# Patient Record
Sex: FEMALE | Race: WHITE | NOT HISPANIC OR LATINO | Employment: OTHER | URBAN - METROPOLITAN AREA
[De-identification: names, ages, dates, MRNs, and addresses within clinical notes are randomized per-mention and may not be internally consistent; named-entity substitution may affect disease eponyms.]

---

## 2019-05-14 ENCOUNTER — TRANSFERRED RECORDS (OUTPATIENT)
Dept: HEALTH INFORMATION MANAGEMENT | Facility: CLINIC | Age: 64
End: 2019-05-14

## 2019-05-21 ENCOUNTER — TRANSFERRED RECORDS (OUTPATIENT)
Dept: HEALTH INFORMATION MANAGEMENT | Facility: CLINIC | Age: 64
End: 2019-05-21

## 2019-06-06 ENCOUNTER — HOSPITAL ENCOUNTER (OUTPATIENT)
Dept: CARDIAC REHAB | Facility: CLINIC | Age: 64
End: 2019-06-06
Attending: INTERNAL MEDICINE
Payer: COMMERCIAL

## 2019-06-06 PROCEDURE — 40000116 ZZH STATISTIC OP CR VISIT: Performed by: REHABILITATION PRACTITIONER

## 2019-06-06 PROCEDURE — 93797 PHYS/QHP OP CAR RHAB WO ECG: CPT | Mod: 59 | Performed by: REHABILITATION PRACTITIONER

## 2019-06-06 PROCEDURE — 93798 PHYS/QHP OP CAR RHAB W/ECG: CPT | Performed by: REHABILITATION PRACTITIONER

## 2019-06-06 PROCEDURE — 40000575 ZZH STATISTIC OP CARDIAC VISIT #2: Performed by: REHABILITATION PRACTITIONER

## 2019-06-11 ENCOUNTER — HOSPITAL ENCOUNTER (OUTPATIENT)
Dept: CARDIAC REHAB | Facility: CLINIC | Age: 64
End: 2019-06-11
Attending: INTERNAL MEDICINE
Payer: COMMERCIAL

## 2019-06-11 PROCEDURE — 40000116 ZZH STATISTIC OP CR VISIT

## 2019-06-11 PROCEDURE — 93798 PHYS/QHP OP CAR RHAB W/ECG: CPT

## 2019-06-13 ENCOUNTER — HOSPITAL ENCOUNTER (OUTPATIENT)
Dept: CARDIAC REHAB | Facility: CLINIC | Age: 64
End: 2019-06-13
Attending: INTERNAL MEDICINE
Payer: COMMERCIAL

## 2019-06-13 PROCEDURE — 93798 PHYS/QHP OP CAR RHAB W/ECG: CPT | Performed by: OCCUPATIONAL THERAPIST

## 2019-06-13 PROCEDURE — 40000116 ZZH STATISTIC OP CR VISIT: Performed by: OCCUPATIONAL THERAPIST

## 2019-06-17 ENCOUNTER — HOSPITAL ENCOUNTER (OUTPATIENT)
Dept: CARDIAC REHAB | Facility: CLINIC | Age: 64
End: 2019-06-17
Attending: INTERNAL MEDICINE
Payer: COMMERCIAL

## 2019-06-17 PROCEDURE — 40000575 ZZH STATISTIC OP CARDIAC VISIT #2: Performed by: OCCUPATIONAL THERAPIST

## 2019-06-17 PROCEDURE — 40000116 ZZH STATISTIC OP CR VISIT: Performed by: OCCUPATIONAL THERAPIST

## 2019-06-17 PROCEDURE — 93798 PHYS/QHP OP CAR RHAB W/ECG: CPT | Performed by: OCCUPATIONAL THERAPIST

## 2019-06-17 PROCEDURE — 93797 PHYS/QHP OP CAR RHAB WO ECG: CPT | Mod: 59 | Performed by: OCCUPATIONAL THERAPIST

## 2019-06-19 ENCOUNTER — HOSPITAL ENCOUNTER (OUTPATIENT)
Dept: CARDIAC REHAB | Facility: CLINIC | Age: 64
End: 2019-06-19
Attending: INTERNAL MEDICINE
Payer: COMMERCIAL

## 2019-06-19 PROCEDURE — 93798 PHYS/QHP OP CAR RHAB W/ECG: CPT | Performed by: REHABILITATION PRACTITIONER

## 2019-06-19 PROCEDURE — 40000116 ZZH STATISTIC OP CR VISIT: Performed by: REHABILITATION PRACTITIONER

## 2019-06-21 ENCOUNTER — HOSPITAL ENCOUNTER (OUTPATIENT)
Dept: CARDIAC REHAB | Facility: CLINIC | Age: 64
End: 2019-06-21
Attending: INTERNAL MEDICINE
Payer: COMMERCIAL

## 2019-06-21 PROCEDURE — 93798 PHYS/QHP OP CAR RHAB W/ECG: CPT | Performed by: OCCUPATIONAL THERAPIST

## 2019-06-21 PROCEDURE — 40000116 ZZH STATISTIC OP CR VISIT: Performed by: OCCUPATIONAL THERAPIST

## 2019-06-24 ENCOUNTER — HOSPITAL ENCOUNTER (OUTPATIENT)
Dept: CARDIAC REHAB | Facility: CLINIC | Age: 64
End: 2019-06-24
Attending: INTERNAL MEDICINE
Payer: COMMERCIAL

## 2019-06-24 PROCEDURE — 40000116 ZZH STATISTIC OP CR VISIT: Performed by: REHABILITATION PRACTITIONER

## 2019-06-24 PROCEDURE — 93797 PHYS/QHP OP CAR RHAB WO ECG: CPT | Mod: 59 | Performed by: REHABILITATION PRACTITIONER

## 2019-06-24 PROCEDURE — 93798 PHYS/QHP OP CAR RHAB W/ECG: CPT | Performed by: REHABILITATION PRACTITIONER

## 2019-06-24 PROCEDURE — 40000575 ZZH STATISTIC OP CARDIAC VISIT #2: Performed by: REHABILITATION PRACTITIONER

## 2019-06-25 ENCOUNTER — HOSPITAL ENCOUNTER (OUTPATIENT)
Dept: CARDIAC REHAB | Facility: CLINIC | Age: 64
End: 2019-06-25
Attending: INTERNAL MEDICINE
Payer: COMMERCIAL

## 2019-06-25 PROCEDURE — 93798 PHYS/QHP OP CAR RHAB W/ECG: CPT

## 2019-06-25 PROCEDURE — 40000116 ZZH STATISTIC OP CR VISIT

## 2019-06-27 ENCOUNTER — HOSPITAL ENCOUNTER (OUTPATIENT)
Dept: CARDIAC REHAB | Facility: CLINIC | Age: 64
End: 2019-06-27
Attending: INTERNAL MEDICINE
Payer: COMMERCIAL

## 2019-06-27 PROCEDURE — 93798 PHYS/QHP OP CAR RHAB W/ECG: CPT

## 2019-06-27 PROCEDURE — 40000116 ZZH STATISTIC OP CR VISIT

## 2019-07-01 ENCOUNTER — HOSPITAL ENCOUNTER (OUTPATIENT)
Dept: CARDIAC REHAB | Facility: CLINIC | Age: 64
End: 2019-07-01
Attending: INTERNAL MEDICINE
Payer: COMMERCIAL

## 2019-07-01 PROCEDURE — 40000116 ZZH STATISTIC OP CR VISIT: Performed by: REHABILITATION PRACTITIONER

## 2019-07-01 PROCEDURE — 93797 PHYS/QHP OP CAR RHAB WO ECG: CPT

## 2019-07-01 PROCEDURE — 93798 PHYS/QHP OP CAR RHAB W/ECG: CPT | Performed by: REHABILITATION PRACTITIONER

## 2019-07-01 PROCEDURE — 40000575 ZZH STATISTIC OP CARDIAC VISIT #2

## 2019-07-03 ENCOUNTER — HOSPITAL ENCOUNTER (OUTPATIENT)
Dept: CARDIAC REHAB | Facility: CLINIC | Age: 64
End: 2019-07-03
Attending: INTERNAL MEDICINE
Payer: COMMERCIAL

## 2019-07-03 PROCEDURE — 93798 PHYS/QHP OP CAR RHAB W/ECG: CPT | Performed by: REHABILITATION PRACTITIONER

## 2019-07-03 PROCEDURE — 40000575 ZZH STATISTIC OP CARDIAC VISIT #2

## 2019-07-03 PROCEDURE — 93797 PHYS/QHP OP CAR RHAB WO ECG: CPT

## 2019-07-03 PROCEDURE — 40000116 ZZH STATISTIC OP CR VISIT: Performed by: REHABILITATION PRACTITIONER

## 2019-07-08 ENCOUNTER — HOSPITAL ENCOUNTER (OUTPATIENT)
Dept: CARDIAC REHAB | Facility: CLINIC | Age: 64
End: 2019-07-08
Attending: INTERNAL MEDICINE
Payer: COMMERCIAL

## 2019-07-08 PROCEDURE — 93798 PHYS/QHP OP CAR RHAB W/ECG: CPT | Performed by: OCCUPATIONAL THERAPIST

## 2019-07-08 PROCEDURE — 93797 PHYS/QHP OP CAR RHAB WO ECG: CPT | Performed by: OCCUPATIONAL THERAPIST

## 2019-07-08 PROCEDURE — 40000575 ZZH STATISTIC OP CARDIAC VISIT #2: Performed by: OCCUPATIONAL THERAPIST

## 2019-07-08 PROCEDURE — 40000116 ZZH STATISTIC OP CR VISIT: Performed by: OCCUPATIONAL THERAPIST

## 2019-07-10 ENCOUNTER — HOSPITAL ENCOUNTER (OUTPATIENT)
Dept: CARDIAC REHAB | Facility: CLINIC | Age: 64
End: 2019-07-10
Attending: INTERNAL MEDICINE
Payer: COMMERCIAL

## 2019-07-10 PROCEDURE — 40000116 ZZH STATISTIC OP CR VISIT

## 2019-07-10 PROCEDURE — 93798 PHYS/QHP OP CAR RHAB W/ECG: CPT

## 2019-07-10 PROCEDURE — 40000575 ZZH STATISTIC OP CARDIAC VISIT #2

## 2019-07-10 PROCEDURE — 93797 PHYS/QHP OP CAR RHAB WO ECG: CPT

## 2019-07-12 ENCOUNTER — HOSPITAL ENCOUNTER (OUTPATIENT)
Dept: CARDIAC REHAB | Facility: CLINIC | Age: 64
End: 2019-07-12
Attending: INTERNAL MEDICINE
Payer: COMMERCIAL

## 2019-07-12 PROCEDURE — 40000575 ZZH STATISTIC OP CARDIAC VISIT #2

## 2019-07-12 PROCEDURE — 40000116 ZZH STATISTIC OP CR VISIT

## 2019-07-12 PROCEDURE — 93798 PHYS/QHP OP CAR RHAB W/ECG: CPT

## 2019-07-12 PROCEDURE — 93797 PHYS/QHP OP CAR RHAB WO ECG: CPT

## 2019-07-15 ENCOUNTER — HOSPITAL ENCOUNTER (OUTPATIENT)
Dept: CARDIAC REHAB | Facility: CLINIC | Age: 64
End: 2019-07-15
Attending: INTERNAL MEDICINE
Payer: COMMERCIAL

## 2019-07-15 PROCEDURE — 40000116 ZZH STATISTIC OP CR VISIT: Performed by: OCCUPATIONAL THERAPIST

## 2019-07-15 PROCEDURE — 93798 PHYS/QHP OP CAR RHAB W/ECG: CPT | Performed by: OCCUPATIONAL THERAPIST

## 2019-07-17 ENCOUNTER — HOSPITAL ENCOUNTER (OUTPATIENT)
Dept: CARDIAC REHAB | Facility: CLINIC | Age: 64
End: 2019-07-17
Attending: INTERNAL MEDICINE
Payer: COMMERCIAL

## 2019-07-17 PROCEDURE — 93797 PHYS/QHP OP CAR RHAB WO ECG: CPT | Performed by: REHABILITATION PRACTITIONER

## 2019-07-17 PROCEDURE — 40000575 ZZH STATISTIC OP CARDIAC VISIT #2: Performed by: REHABILITATION PRACTITIONER

## 2019-07-17 PROCEDURE — 93798 PHYS/QHP OP CAR RHAB W/ECG: CPT | Performed by: REHABILITATION PRACTITIONER

## 2019-07-17 PROCEDURE — 40000116 ZZH STATISTIC OP CR VISIT: Performed by: REHABILITATION PRACTITIONER

## 2019-07-19 ENCOUNTER — HOSPITAL ENCOUNTER (OUTPATIENT)
Dept: CARDIAC REHAB | Facility: CLINIC | Age: 64
End: 2019-07-19
Attending: INTERNAL MEDICINE
Payer: COMMERCIAL

## 2019-07-19 PROCEDURE — 40000116 ZZH STATISTIC OP CR VISIT: Performed by: REHABILITATION PRACTITIONER

## 2019-07-19 PROCEDURE — 93798 PHYS/QHP OP CAR RHAB W/ECG: CPT | Performed by: REHABILITATION PRACTITIONER

## 2019-07-22 ENCOUNTER — HOSPITAL ENCOUNTER (OUTPATIENT)
Dept: CARDIAC REHAB | Facility: CLINIC | Age: 64
End: 2019-07-22
Attending: INTERNAL MEDICINE
Payer: COMMERCIAL

## 2019-07-22 PROCEDURE — 40000116 ZZH STATISTIC OP CR VISIT: Performed by: OCCUPATIONAL THERAPIST

## 2019-07-22 PROCEDURE — 93798 PHYS/QHP OP CAR RHAB W/ECG: CPT | Performed by: OCCUPATIONAL THERAPIST

## 2019-07-24 ENCOUNTER — HOSPITAL ENCOUNTER (OUTPATIENT)
Dept: CARDIAC REHAB | Facility: CLINIC | Age: 64
End: 2019-07-24
Attending: INTERNAL MEDICINE
Payer: COMMERCIAL

## 2019-07-24 PROCEDURE — 93797 PHYS/QHP OP CAR RHAB WO ECG: CPT | Performed by: REHABILITATION PRACTITIONER

## 2019-07-24 PROCEDURE — 93798 PHYS/QHP OP CAR RHAB W/ECG: CPT | Performed by: REHABILITATION PRACTITIONER

## 2019-07-24 PROCEDURE — 40000116 ZZH STATISTIC OP CR VISIT: Performed by: REHABILITATION PRACTITIONER

## 2019-07-24 PROCEDURE — 40000575 ZZH STATISTIC OP CARDIAC VISIT #2: Performed by: REHABILITATION PRACTITIONER

## 2019-07-26 ENCOUNTER — HOSPITAL ENCOUNTER (OUTPATIENT)
Dept: CARDIAC REHAB | Facility: CLINIC | Age: 64
End: 2019-07-26
Attending: INTERNAL MEDICINE
Payer: COMMERCIAL

## 2019-07-26 PROCEDURE — 40000116 ZZH STATISTIC OP CR VISIT

## 2019-07-26 PROCEDURE — 93798 PHYS/QHP OP CAR RHAB W/ECG: CPT

## 2019-07-30 ENCOUNTER — HOSPITAL ENCOUNTER (OUTPATIENT)
Dept: CARDIAC REHAB | Facility: CLINIC | Age: 64
End: 2019-07-30
Attending: INTERNAL MEDICINE
Payer: COMMERCIAL

## 2019-07-30 PROCEDURE — 40000116 ZZH STATISTIC OP CR VISIT: Performed by: REHABILITATION PRACTITIONER

## 2019-07-30 PROCEDURE — 40000575 ZZH STATISTIC OP CARDIAC VISIT #2: Performed by: REHABILITATION PRACTITIONER

## 2019-07-30 PROCEDURE — 93798 PHYS/QHP OP CAR RHAB W/ECG: CPT | Performed by: REHABILITATION PRACTITIONER

## 2019-07-30 PROCEDURE — 93797 PHYS/QHP OP CAR RHAB WO ECG: CPT | Performed by: REHABILITATION PRACTITIONER

## 2021-05-10 ENCOUNTER — OFFICE VISIT (OUTPATIENT)
Dept: FAMILY MEDICINE | Facility: CLINIC | Age: 66
End: 2021-05-10

## 2021-05-10 VITALS
SYSTOLIC BLOOD PRESSURE: 120 MMHG | TEMPERATURE: 98.4 F | HEIGHT: 58 IN | HEART RATE: 82 BPM | DIASTOLIC BLOOD PRESSURE: 76 MMHG | BODY MASS INDEX: 30.69 KG/M2 | OXYGEN SATURATION: 95 % | WEIGHT: 146.2 LBS

## 2021-05-10 DIAGNOSIS — H90.8 MIXED CONDUCTIVE AND SENSORINEURAL HEARING LOSS, UNSPECIFIED LATERALITY: ICD-10-CM

## 2021-05-10 DIAGNOSIS — F32.89 OTHER DEPRESSION: ICD-10-CM

## 2021-05-10 DIAGNOSIS — Z12.4 SCREENING FOR CERVICAL CANCER: ICD-10-CM

## 2021-05-10 DIAGNOSIS — F41.9 ANXIETY: ICD-10-CM

## 2021-05-10 DIAGNOSIS — I10 ESSENTIAL HYPERTENSION: ICD-10-CM

## 2021-05-10 DIAGNOSIS — Z72.0 TOBACCO ABUSE: ICD-10-CM

## 2021-05-10 DIAGNOSIS — R73.09 HIGH GLUCOSE: ICD-10-CM

## 2021-05-10 DIAGNOSIS — E78.00 PURE HYPERCHOLESTEROLEMIA: ICD-10-CM

## 2021-05-10 DIAGNOSIS — N89.8 VAGINAL ITCHING: ICD-10-CM

## 2021-05-10 DIAGNOSIS — I25.119 ATHEROSCLEROSIS OF NATIVE CORONARY ARTERY OF NATIVE HEART WITH ANGINA PECTORIS (H): ICD-10-CM

## 2021-05-10 DIAGNOSIS — Z00.00 ROUTINE GENERAL MEDICAL EXAMINATION AT A HEALTH CARE FACILITY: Primary | ICD-10-CM

## 2021-05-10 DIAGNOSIS — Z11.59 ENCOUNTER FOR HEPATITIS C SCREENING TEST FOR LOW RISK PATIENT: ICD-10-CM

## 2021-05-10 DIAGNOSIS — Z78.0 POSTMENOPAUSAL STATUS: ICD-10-CM

## 2021-05-10 DIAGNOSIS — R39.9 URINARY SYMPTOM OR SIGN: ICD-10-CM

## 2021-05-10 PROBLEM — Z76.89 HEALTH CARE HOME: Status: ACTIVE | Noted: 2021-05-10

## 2021-05-10 PROBLEM — R94.39 ABNORMAL CARDIOVASCULAR STRESS TEST: Status: ACTIVE | Noted: 2021-05-10

## 2021-05-10 PROBLEM — Z71.89 ACP (ADVANCE CARE PLANNING): Status: ACTIVE | Noted: 2021-05-10

## 2021-05-10 PROBLEM — Z00.6 RESEARCH STUDY PATIENT: Status: ACTIVE | Noted: 2019-05-21

## 2021-05-10 PROBLEM — R00.2 PALPITATIONS: Status: ACTIVE | Noted: 2021-05-10

## 2021-05-10 PROBLEM — R06.09 DOE (DYSPNEA ON EXERTION): Status: ACTIVE | Noted: 2021-05-10

## 2021-05-10 LAB
ALBUMIN (URINE): ABNORMAL MG/DL
APPEARANCE UR: CLEAR
BACTERIA (WET PREP): ABNORMAL
BACTERIA, UR: ABNORMAL
BILIRUB UR QL: ABNORMAL
CASTS/LPF: ABNORMAL
CLUE CELLS: NEGATIVE
COLOR UR: YELLOW
EP/HPF: ABNORMAL
GLUCOSE URINE: ABNORMAL MG/DL
HGB UR QL: ABNORMAL
KETONES UR QL: ABNORMAL MG/DL
LEUKOCYTE ESTERASE - QUEST: ABNORMAL
MISC.: ABNORMAL
NITRITE UR QL STRIP: ABNORMAL
PH UR STRIP: 7 PH (ref 5–7)
PH WET PREP: 5 (ref 3.5–4.5)
PMNS (WET PREP): ABNORMAL
RBC, UR MICRO: ABNORMAL (ref ?–2)
SP. GRAVITY: 1.01
TRICHOMONAS VAGINALS: ABNORMAL
UROBILINOGEN UR QL STRIP: 0.2 EU/DL (ref 0.2–1)
WBC, UR MICRO: ABNORMAL (ref ?–2)
YEAST CELLS: ABNORMAL

## 2021-05-10 PROCEDURE — 99387 INIT PM E/M NEW PAT 65+ YRS: CPT | Mod: 25 | Performed by: FAMILY MEDICINE

## 2021-05-10 PROCEDURE — 99213 OFFICE O/P EST LOW 20 MIN: CPT | Mod: 25 | Performed by: FAMILY MEDICINE

## 2021-05-10 PROCEDURE — 83036 HEMOGLOBIN GLYCOSYLATED A1C: CPT | Performed by: FAMILY MEDICINE

## 2021-05-10 PROCEDURE — 81001 URINALYSIS AUTO W/SCOPE: CPT | Performed by: FAMILY MEDICINE

## 2021-05-10 PROCEDURE — 87210 SMEAR WET MOUNT SALINE/INK: CPT | Performed by: FAMILY MEDICINE

## 2021-05-10 PROCEDURE — 36415 COLL VENOUS BLD VENIPUNCTURE: CPT | Performed by: FAMILY MEDICINE

## 2021-05-10 RX ORDER — METOPROLOL SUCCINATE 25 MG/1
25 TABLET, EXTENDED RELEASE ORAL
COMMUNITY
Start: 2021-02-11 | End: 2024-08-26

## 2021-05-10 RX ORDER — ATORVASTATIN CALCIUM 80 MG/1
80 TABLET, FILM COATED ORAL
COMMUNITY
Start: 2021-02-11 | End: 2024-09-09

## 2021-05-10 RX ORDER — CLOPIDOGREL BISULFATE 75 MG/1
75 TABLET ORAL
COMMUNITY
Start: 2021-02-11 | End: 2024-09-09

## 2021-05-10 RX ORDER — VENLAFAXINE HYDROCHLORIDE 37.5 MG/1
37.5 CAPSULE, EXTENDED RELEASE ORAL
COMMUNITY
Start: 2021-03-10 | End: 2022-01-28

## 2021-05-10 RX ORDER — ASPIRIN 81 MG/1
81 TABLET ORAL DAILY
COMMUNITY
End: 2024-03-06

## 2021-05-10 ASSESSMENT — ANXIETY QUESTIONNAIRES
1. FEELING NERVOUS, ANXIOUS, OR ON EDGE: SEVERAL DAYS
GAD7 TOTAL SCORE: 4
2. NOT BEING ABLE TO STOP OR CONTROL WORRYING: SEVERAL DAYS
6. BECOMING EASILY ANNOYED OR IRRITABLE: NOT AT ALL
5. BEING SO RESTLESS THAT IT IS HARD TO SIT STILL: NOT AT ALL
7. FEELING AFRAID AS IF SOMETHING AWFUL MIGHT HAPPEN: NOT AT ALL
IF YOU CHECKED OFF ANY PROBLEMS ON THIS QUESTIONNAIRE, HOW DIFFICULT HAVE THESE PROBLEMS MADE IT FOR YOU TO DO YOUR WORK, TAKE CARE OF THINGS AT HOME, OR GET ALONG WITH OTHER PEOPLE: SOMEWHAT DIFFICULT
3. WORRYING TOO MUCH ABOUT DIFFERENT THINGS: SEVERAL DAYS

## 2021-05-10 ASSESSMENT — PATIENT HEALTH QUESTIONNAIRE - PHQ9: 5. POOR APPETITE OR OVEREATING: SEVERAL DAYS

## 2021-05-10 ASSESSMENT — MIFFLIN-ST. JEOR: SCORE: 1096.88

## 2021-05-10 NOTE — PATIENT INSTRUCTIONS
Preventive Health Recommendations    See your health care provider every year to    Review health changes.     Discuss preventive care.      Review your medicines if your doctor has prescribed any.      You no longer need a yearly Pap test unless you've had an abnormal Pap test in the past 10 years. If you have vaginal symptoms, such as bleeding or discharge, be sure to talk with your provider about a Pap test.      Every 1 to 2 years, have a mammogram.  If you are over 69, talk with your health care provider about whether or not you want to continue having screening mammograms.      Every 10 years, have a colonoscopy. Or, have a yearly FIT test (stool test). These exams will check for colon cancer.       Have a cholesterol test every 5 years, or more often if your doctor advises it.       Have a diabetes test (fasting glucose) every three years. If you are at risk for diabetes, you should have this test more often.       At age 65, have a bone density scan (DEXA) to check for osteoporosis (brittle bone disease).    Shots:    Get a flu shot each year.    Get a tetanus shot every 10 years.    Talk to your doctor about your pneumonia vaccines. There are now two you should receive - Pneumovax (PPSV 23) and Prevnar (PCV 13).    Talk to your pharmacist about the shingles vaccine.    Talk to your doctor about the hepatitis B vaccine.    Nutrition:     Eat at least 5 servings of fruits and vegetables each day.      Eat whole-grain bread, whole-wheat pasta and brown rice instead of white grains and rice.      Get adequate about Calcium and Vitamin D.     Lifestyle    Exercise at least 150 minutes a week (30 minutes a day, 5 days a week). This will help you control your weight and prevent disease.      Limit alcohol to one drink per day.      No smoking.       Wear sunscreen to prevent skin cancer.       See your dentist twice a year for an exam and cleaning.      See your eye doctor every 1 to 2 years to screen for  conditions such as glaucoma, macular degeneration, cataracts, etc.    Personalized Prevention Plan  You are due for the preventive services outlined below.  Your care team is available to assist you in scheduling these services.  If you have already completed any of these items, please share that information with your care team to update in your medical record.    Health Maintenance Due   Topic Date Due     Osteoporosis Screening  Never done     Discuss Advance Care Planning  Never done     Mammogram  Never done     Colorectal Cancer Screening  Never done     Hepatitis C Screening  Never done     Cholesterol Lab  Never done     Zoster (Shingles) Vaccine (2 of 3) 01/17/2014     Annual Wellness Visit  Never done     FALL RISK ASSESSMENT  Never done     Pneumococcal Vaccine (1 of 1 - PPSV23) 06/15/2019     PHQ-2  Never done     ASSESSMENT/PLAN:   1. Routine general medical examination at a health care facility      2. Urinary symptom or sign  Checked urine.  - HCL  Urinalysis, Routine (BFP)    3. Other depression      4. Tobacco abuse  Advised to quit, she said she is considering this.    5. Essential hypertension  Controlled, mediations filled by cardiology.  - Lipid Panel (BFP); Future  - Comprehensive Metobolic Panel (BFP); Future  - VENOUS COLLECTION; Future    6. Atherosclerosis of native coronary artery of native heart with angina pectoris (H)  Sees cardiology.    7. Pure hypercholesterolemia  Continue with cardiology.    8. Screening for cervical cancer    - ThinPrep Pap and HPV (mRNa E6/E7) HPV always run([facility])    9. Encounter for hepatitis C screening test for low risk patient    - Hepatits C antibody ([facility]); Future    10. Vaginal itching  Offered referral to see gynecology, she will wait on this. Wet prep negative/  - WET PREP (BFP)    11. Anxiety  Continue with effexor, this is working well for her. followup when refill needed.    12. Mixed conductive and sensorineural hearing loss, unspecified  "laterality  She has apt for this already.    13. Postmenopausal status    - Dexa hip/pelvis/spine*  - RADIOLOGY REFERRAL    Results for orders placed or performed in visit on 05/10/21   HCL  Urinalysis, Routine (BFP)     Status: Abnormal   Result Value Ref Range    Color Urine Yellow     Appearance Urine Clear     Glucose Urine Neg neg mg/dL    Bilirubin Urine Neg neg    Ketones Urine Neg neg mg/dL    Specific Gravity 1.010     Blood Urine Trace (A) neg    pH Urine 7.0 5.0 - 7.0 pH    Albumin Urine neg neg - neg mg/dL    Urobilinogen Urine 0.2 0.2 - 1.0 EU/dL    Nitrite Urine Neg NEG    Leukocyte Esterase neg neg - neg    Wbc, Urine Micro 0-1 neg - 2    RBC Micro Urine 0-1 neg - 2    EP/HPF few     Bacteria Urine neg neg - neg    Casts/LPF neg     Miscellaneous neg    WET PREP (BFP)     Status: Abnormal   Result Value Ref Range    Trichomonas Vaginals neg     yeast cells neg     PMNs Wet Prep neg     Clue cells Negative Negative    Bacteria (Wet Prep) few     pH Wet Prep 5.0 (A) 3.5 - 4.5     Patient has been advised of split billing requirements and indicates understanding: Yes  COUNSELING:   Reviewed preventive health counseling, as reflected in patient instructions       Regular exercise       Healthy diet/nutrition       Advance Care Planning    Estimated body mass index is 30.29 kg/m  as calculated from the following:    Height as of this encounter: 1.48 m (4' 10.25\").    Weight as of this encounter: 66.3 kg (146 lb 3.2 oz).        She reports that she has been smoking. She has never used smokeless tobacco.        Dora Marie MD  [facility]    "

## 2021-05-10 NOTE — LETTER
[facility]  [address] [place]  [address]  [facility] [address]  [phone]      May 10, 2021      Eliza Saenz  [address] [facility] [id number]      EMERGENCY CARE PLAN  Presenting Problem Treatment Plan   Questions or concerns during clinic hours I will call the clinic directly:    [facility]  [address] , [address]  [place] [address]  [phone]   Questions or concerns outside clinic hours  I will call the 24 hour line at [phone]   Patient needs to schedule an appointment  I will call the  scheduling line at [phone]   Same day treatment   I will call the clinic first, then  urgent care and/or  express care if needed   Clinic Care Coordinators Елена Roberts RN:  [phone]  [facility] Clinical Support Staff: [phone]    Crisis Services:  Behavioral or Mental Health [facility] ([facility] Prov[facility])   [phone]   Emergency treatment--Immediately CALL [phone]

## 2021-05-10 NOTE — PROGRESS NOTES
SUBJECTIVE:   CC: Eliza Saenz is an 65 year old woman who presents for preventive health visit.     Here for a physical. Has some vaginal itching but no odor. Hasn't had a pap for years.  Has some vaginal itching. Hasn't a pap for 20 years. Wants to also check the urine.  Anxiety medication--has enough until next year. This is working ok.    Patient has been advised of split billing requirements and indicates understanding: Yes  Healthy Habits:    Do you get at least three servings of calcium containing foods daily (dairy, green leafy vegetables, etc.)? yes    Amount of exercise or daily activities, outside of work: walking trails    Problems taking medications regularly No    Medication side effects: No    Have you had an eye exam in the past two years? yes    Do you see a dentist twice per year? yes    Do you have sleep apnea, excessive snoring or daytime drowsiness? Sometimes can't sleep.      Today's PHQ-2 Score: No flowsheet data found.  Do you feel safe in your environment? Yes    Have you ever done Advance Care Planning? (For example, a Health Directive, POLST, or a discussion with a medical provider or your loved ones about your wishes): Yes, patient states has an Advance Care Planning document and will bring a copy to the clinic.    Social History     Tobacco Use     Smoking status: Current Every Day Smoker     Smokeless tobacco: Never Used   Substance Use Topics     Alcohol use: Not on file     If you drink alcohol do you typically have >3 drinks per day or >7 drinks per week? No                     Reviewed orders with patient.  Reviewed health maintenance and updated orders accordingly - Yes  BP Readings from Last 3 Encounters:   05/10/21 120/76    Wt Readings from Last 3 Encounters:   05/10/21 66.3 kg (146 lb 3.2 oz)                  Patient Active Problem List   Diagnosis     Tobacco abuse     Research study patient     Pure hypercholesterolemia     Migraine headache     Palpitations      Essential hypertension     SAEED (dyspnea on exertion)     Depression     Coronary atherosclerosis     Carotid stenosis, bilateral     Abnormal cardiovascular stress test     ACP (advance care planning)     Health Care Home     Anxiety     History reviewed. No pertinent surgical history.    Social History     Tobacco Use     Smoking status: Current Every Day Smoker     Smokeless tobacco: Never Used   Substance Use Topics     Alcohol use: Not on file     History reviewed. No pertinent family history.      Current Outpatient Medications   Medication Sig Dispense Refill     aspirin 81 MG EC tablet Take 81 mg by mouth daily       atorvastatin (LIPITOR) 80 MG tablet Take 80 mg by mouth       clopidogrel (PLAVIX) 75 MG tablet Take 75 mg by mouth       metoprolol succinate ER (TOPROL-XL) 25 MG 24 hr tablet Take 25 mg by mouth       venlafaxine (EFFEXOR-XR) 37.5 MG 24 hr capsule Take 37.5 mg by mouth           Pertinent mammograms are reviewed under the imaging tab.    History of abnormal Pap smear: NO - age 30-65 PAP every 5 years with negative HPV co-testing recommended     Reviewed and updated as needed this visit by clinical staff  Tobacco  Allergies  Meds  Problems  Med Hx  Surg Hx  Fam Hx          Reviewed and updated as needed this visit by Provider                History reviewed. No pertinent past medical history.   History reviewed. No pertinent surgical history.    ROS:  CONSTITUTIONAL: NEGATIVE for fever, chills, change in weight  INTEGUMENTARU/SKIN: NEGATIVE for worrisome rashes, moles or lesions  EYES: NEGATIVE for vision changes or irritation  ENT: NEGATIVE for ear, mouth and throat problems  RESP: NEGATIVE for significant cough or SOB  BREAST: NEGATIVE for masses, tenderness or discharge  CV: NEGATIVE for chest pain, palpitations or peripheral edema  GI: NEGATIVE for nausea, abdominal pain, heartburn, or change in bowel habits  : NEGATIVE for unusual urinary or vaginal symptoms. Periods are  "regular.  MUSCULOSKELETAL: NEGATIVE for significant arthralgias or myalgia  NEURO: NEGATIVE for weakness, dizziness or paresthesias  PSYCHIATRIC: NEGATIVE for changes in mood or affect  Except hearing loss, anxiety, rash, vaginal problems    OBJECTIVE:   /76 (BP Location: Right arm, Patient Position: Sitting, Cuff Size: Adult Large)   Pulse 82   Temp 98.4  F (36.9  C) (Oral)   Ht 1.48 m (4' 10.25\")   Wt 66.3 kg (146 lb 3.2 oz)   SpO2 95%   BMI 30.29 kg/m    EXAM:  GENERAL: healthy, alert and no distress  EYES: Eyes grossly normal to inspection, PERRL and conjunctivae and sclerae normal  HENT: ear canals and TM's normal, nose and mouth without ulcers or lesions  NECK: no adenopathy, no asymmetry, masses, or scars and thyroid normal to palpation  RESP: lungs clear to auscultation - no rales, rhonchi or wheezes  BREAST: normal without masses, tenderness or nipple discharge and no palpable axillary masses or adenopathy  CV: regular rate and rhythm, normal S1 S2, no S3 or S4, no murmur, click or rub, no peripheral edema and peripheral pulses strong  ABDOMEN: soft, nontender, no hepatosplenomegaly, no masses and bowel sounds normal   (female): normal female external genitalia, normal urethral meatus, vaginal mucosa pink, moist, well rugated, and normal cervix/adnexa/uterus without masses or discharge  MS: no gross musculoskeletal defects noted, no edema  SKIN: no suspicious lesions or rashes  NEURO: Normal strength and tone, mentation intact and speech normal  PSYCH: mentation appears normal, affect normal/bright  LYMPH: no cervical, supraclavicular, axillary, or inguinal adenopathy        ASSESSMENT/PLAN:   1. Routine general medical examination at a health care facility      2. Urinary symptom or sign  Checked urine.  - HCL  Urinalysis, Routine (P)    3. Other depression      4. Tobacco abuse  Advised to quit, she said she is considering this.    5. Essential hypertension  Controlled, mediations filled " by cardiology.  - Lipid Panel (BFP); Future  - Comprehensive Metobolic Panel (BFP); Future  - VENOUS COLLECTION; Future    6. Atherosclerosis of native coronary artery of native heart with angina pectoris (H)  Sees cardiology.    7. Pure hypercholesterolemia  Continue with cardiology.    8. Screening for cervical cancer    - ThinPrep Pap and HPV (mRNa E6/E7) HPV always run([facility])    9. Encounter for hepatitis C screening test for low risk patient    - Hepatits C antibody ([facility]); Future    10. Vaginal itching  Offered referral to see gynecology, she will wait on this. Wet prep negative/  - WET PREP (BFP)    11. Anxiety  Continue with effexor, this is working well for her. followup when refill needed.    12. Mixed conductive and sensorineural hearing loss, unspecified laterality  She has apt for this already.    13. Postmenopausal status    - Dexa hip/pelvis/spine*  - RADIOLOGY REFERRAL    Results for orders placed or performed in visit on 05/10/21   HCL  Urinalysis, Routine (BFP)     Status: Abnormal   Result Value Ref Range    Color Urine Yellow     Appearance Urine Clear     Glucose Urine Neg neg mg/dL    Bilirubin Urine Neg neg    Ketones Urine Neg neg mg/dL    Specific Gravity 1.010     Blood Urine Trace (A) neg    pH Urine 7.0 5.0 - 7.0 pH    Albumin Urine neg neg - neg mg/dL    Urobilinogen Urine 0.2 0.2 - 1.0 EU/dL    Nitrite Urine Neg NEG    Leukocyte Esterase neg neg - neg    Wbc, Urine Micro 0-1 neg - 2    RBC Micro Urine 0-1 neg - 2    EP/HPF few     Bacteria Urine neg neg - neg    Casts/LPF neg     Miscellaneous neg    WET PREP (BFP)     Status: Abnormal   Result Value Ref Range    Trichomonas Vaginals neg     yeast cells neg     PMNs Wet Prep neg     Clue cells Negative Negative    Bacteria (Wet Prep) few     pH Wet Prep 5.0 (A) 3.5 - 4.5     Patient has been advised of split billing requirements and indicates understanding: Yes  COUNSELING:   Reviewed preventive health counseling, as reflected in  "patient instructions       Regular exercise       Healthy diet/nutrition       Advance Care Planning    Estimated body mass index is 30.29 kg/m  as calculated from the following:    Height as of this encounter: 1.48 m (4' 10.25\").    Weight as of this encounter: 66.3 kg (146 lb 3.2 oz).        She reports that she has been smoking. She has never used smokeless tobacco.        Dora Marie MD  [facility]  "

## 2021-05-10 NOTE — NURSING NOTE
Eliza is here to establish care and nonfasting CPX. Wants a pap.    Pre-Visit Screening:  Immunizations:UTD  Colonoscopy:Needs-declined  Mammogram:Needs-declined  Asthma Action Test/Plan:NA  PHQ9:today  GAD7:today  Questioned patient about current smoking habits Pt. smoker  OK to leave a detailed message on voice mail for today's visit yes, phone # [phone]  Hearing done today

## 2021-05-11 ASSESSMENT — ANXIETY QUESTIONNAIRES: GAD7 TOTAL SCORE: 4

## 2021-05-12 LAB
CLINICAL HISTORY - QUEST: NORMAL
COMMENT - QUEST: NORMAL
CYTOTECHNOLOGIST - QUEST: NORMAL
DESCRIPTIVE DIAGNOSIS - QUEST: NORMAL
HPV MRNA E6/E7: NOT DETECTED
LAST PAP DX - QUEST: NORMAL
LMP - QUEST: NORMAL
PREV BX DX - QUEST: NORMAL
SOURCE: NORMAL
STATEMENT OF ADEQUACY - QUEST: NORMAL

## 2021-05-14 DIAGNOSIS — Z11.59 ENCOUNTER FOR HEPATITIS C SCREENING TEST FOR LOW RISK PATIENT: ICD-10-CM

## 2021-05-14 DIAGNOSIS — I10 ESSENTIAL HYPERTENSION: ICD-10-CM

## 2021-05-14 LAB
ALBUMIN SERPL-MCNC: 4.3 G/DL (ref 3.6–5.1)
ALBUMIN/GLOB SERPL: 2 {RATIO} (ref 1–2.5)
ALP SERPL-CCNC: 87 U/L (ref 33–130)
ALT 1742-6: 22 U/L (ref 0–32)
AST 1920-8: 18 U/L (ref 0–35)
BILIRUB SERPL-MCNC: 0.9 MG/DL (ref 0.2–1.2)
BUN SERPL-MCNC: 19 MG/DL (ref 7–25)
BUN/CREATININE RATIO: 18.1 (ref 6–22)
CALCIUM SERPL-MCNC: 10 MG/DL (ref 8.6–10.3)
CHLORIDE SERPLBLD-SCNC: 104.1 MMOL/L (ref 98–110)
CHOLEST SERPL-MCNC: 163 MG/DL (ref 0–199)
CHOLEST/HDLC SERPL: 3 {RATIO} (ref 0–5)
CO2 SERPL-SCNC: 29.8 MMOL/L (ref 20–32)
CREAT SERPL-MCNC: 1.05 MG/DL (ref 0.6–1.3)
GLOBULIN, CALCULATED - QUEST: 2.2 (ref 1.9–3.7)
GLUCOSE SERPL-MCNC: 128 MG/DL (ref 60–99)
HBA1C MFR BLD: 5.7 % (ref 4–7)
HDLC SERPL-MCNC: 53 MG/DL (ref 40–150)
LDLC SERPL CALC-MCNC: 72 MG/DL (ref 0–130)
POTASSIUM SERPL-SCNC: 4.94 MMOL/L (ref 3.5–5.3)
PROT SERPL-MCNC: 6.5 G/DL (ref 6.1–8.1)
SODIUM SERPL-SCNC: 142.2 MMOL/L (ref 135–146)
TRIGL SERPL-MCNC: 189 MG/DL (ref 0–149)

## 2021-05-14 PROCEDURE — 36415 COLL VENOUS BLD VENIPUNCTURE: CPT | Performed by: FAMILY MEDICINE

## 2021-05-14 PROCEDURE — 80061 LIPID PANEL: CPT | Performed by: FAMILY MEDICINE

## 2021-05-14 PROCEDURE — 80053 COMPREHEN METABOLIC PANEL: CPT | Performed by: FAMILY MEDICINE

## 2021-05-15 ENCOUNTER — HEALTH MAINTENANCE LETTER (OUTPATIENT)
Age: 66
End: 2021-05-15

## 2021-05-17 LAB
HCV AB - QUEST: NORMAL
SIGNAL TO CUT OFF - QUEST: 0.01

## 2021-06-11 ENCOUNTER — DOCUMENTATION ONLY (OUTPATIENT)
Dept: OTHER | Facility: CLINIC | Age: 66
End: 2021-06-11

## 2021-07-12 ENCOUNTER — OFFICE VISIT (OUTPATIENT)
Dept: FAMILY MEDICINE | Facility: CLINIC | Age: 66
End: 2021-07-12

## 2021-07-12 VITALS
BODY MASS INDEX: 29.39 KG/M2 | TEMPERATURE: 97.2 F | HEIGHT: 58 IN | SYSTOLIC BLOOD PRESSURE: 104 MMHG | OXYGEN SATURATION: 98 % | DIASTOLIC BLOOD PRESSURE: 60 MMHG | WEIGHT: 140 LBS | HEART RATE: 75 BPM

## 2021-07-12 DIAGNOSIS — M79.661 PAIN OF RIGHT LOWER LEG: Primary | ICD-10-CM

## 2021-07-12 PROCEDURE — 99214 OFFICE O/P EST MOD 30 MIN: CPT | Performed by: FAMILY MEDICINE

## 2021-07-12 ASSESSMENT — MIFFLIN-ST. JEOR: SCORE: 1063.76

## 2021-07-12 NOTE — PROGRESS NOTES
Assessment & Plan   Problem List Items Addressed This Visit     None      Visit Diagnoses     Pain of right lower leg    -  Primary    Relevant Orders    Radiology Referral    US Lower Extremity Venous Duplex Right    Physical Therapy Referral         1. Pain of right lower leg  We should do an ultrasound to rule out DVT. More likely to be musculoskeletal. Risks discussed.  Then referral to physical therapy.  - Radiology Referral; Future  - US Lower Extremity Venous Duplex Right; Future           Tobacco Cessation:   reports that she has been smoking. She has never used smokeless tobacco.      FUTURE APPOINTMENTS:       - Follow-up visit per results.    No follow-ups on file.    Dora Marie MD  [facility]    Subjective     Nursing Notes:   Juana Snow  7/12/2021 10:58 AM  Signed  Chief Complaint   Patient presents with     Leg Pain     right upper leg aches, bottom of leg feels on fire and burning. Been going on for several months.         Pre-visit Screening:  Immunizations:  up to date  Colonoscopy:  is up to date  Mammogram: declined  Asthma Action Test/Plan:  NA  PHQ9:  UTD  GAD7:  UTD  Questioned patient about current smoking habits Pt. has never smoked.  Ok to leave detailed message on voice mail for today's visit only Yes, phone # cell           Eliza Saenz is a 66 year old female who presents to clinic today for the following health issues   HPI     Here to followup on her right lateral leg. It's achy and lower leg there is a lump and feels like it's on fire. For a couple of months. Neg anusha. Wondering about an ultrasound.   No back pain,     Review of Systems   Constitutional, HEENT, cardiovascular, pulmonary, gi and gu systems are negative, except as otherwise noted.  Except muscle pain, weakness, slight vertigo to left head turn      Objective    /60 (BP Location: Right arm, Patient Position: Sitting, Cuff Size: Adult Large)   Pulse 75   Temp 97.2  F (36.2  C)  "(Temporal)   Ht 1.48 m (4' 10.25\")   Wt 63.5 kg (140 lb)   SpO2 98%   BMI 29.01 kg/m    Body mass index is 29.01 kg/m .  Physical Exam   GENERAL: healthy, alert and no distress  NECK: no adenopathy, no asymmetry, masses, or scars and thyroid normal to palpation  RESP: lungs clear to auscultation - no rales, rhonchi or wheezes  CV: regular rate and rhythm, normal S1 S2, no S3 or S4, no murmur, click or rub, no peripheral edema and peripheral pulses strong  MS: no gross musculoskeletal defects noted, no edema  NEURO: Normal strength and tone, mentation intact and speech normal  PSYCH: mentation appears normal, affect normal/bright  Right lower leg--neg straight leg raise,normal LE strength bilateral legs. Neg holmans, no swelling, + tenderness, no masspalpated right lower lateral calf, tender in lateral IT band upper right lateral leg.          "

## 2021-07-12 NOTE — NURSING NOTE
Chief Complaint   Patient presents with     Leg Pain     right upper leg aches, bottom of leg feels on fire and burning. Been going on for several months.         Pre-visit Screening:  Immunizations:  up to date  Colonoscopy:  is up to date  Mammogram: declined  Asthma Action Test/Plan:  NA  PHQ9:  UTD  GAD7:  UTD  Questioned patient about current smoking habits Pt. Smokes 20 cigs per day  Ok to leave detailed message on voice mail for today's visit only Yes, phone # cell

## 2021-07-12 NOTE — PATIENT INSTRUCTIONS
Assessment & Plan   Problem List Items Addressed This Visit     None      Visit Diagnoses     Pain of right lower leg    -  Primary    Relevant Orders    Radiology Referral    US Lower Extremity Venous Duplex Right    Physical Therapy Referral         1. Pain of right lower leg  We should do an ultrasound to rule out DVT. More likely to be musculoskeletal.   Then referral to physical therapy.  - Radiology Referral; Future  - US Lower Extremity Venous Duplex Right; Future           Tobacco Cessation:   reports that she has been smoking. She has never used smokeless tobacco.      FUTURE APPOINTMENTS:       - Follow-up visit per results.    No follow-ups on file.    Dora Marie MD  [facility]

## 2021-07-13 DIAGNOSIS — M79.661 PAIN OF RIGHT LOWER LEG: ICD-10-CM

## 2021-09-04 ENCOUNTER — HEALTH MAINTENANCE LETTER (OUTPATIENT)
Age: 66
End: 2021-09-04

## 2021-10-04 ENCOUNTER — MEDICAL CORRESPONDENCE (OUTPATIENT)
Dept: HEALTH INFORMATION MANAGEMENT | Facility: CLINIC | Age: 66
End: 2021-10-04

## 2021-10-05 ENCOUNTER — LAB (OUTPATIENT)
Dept: LAB | Facility: CLINIC | Age: 66
End: 2021-10-05
Payer: COMMERCIAL

## 2021-10-05 DIAGNOSIS — E78.00 PURE HYPERCHOLESTEROLEMIA: ICD-10-CM

## 2021-10-05 LAB
ALT SERPL W P-5'-P-CCNC: 38 U/L (ref 0–50)
AST SERPL W P-5'-P-CCNC: 27 U/L (ref 0–45)
CHOLEST SERPL-MCNC: 124 MG/DL
FASTING STATUS PATIENT QL REPORTED: YES
HDLC SERPL-MCNC: 49 MG/DL
LDLC SERPL CALC-MCNC: 42 MG/DL
NONHDLC SERPL-MCNC: 75 MG/DL
TRIGL SERPL-MCNC: 166 MG/DL

## 2021-10-05 PROCEDURE — 84450 TRANSFERASE (AST) (SGOT): CPT

## 2021-10-05 PROCEDURE — 84460 ALANINE AMINO (ALT) (SGPT): CPT

## 2021-10-05 PROCEDURE — 83695 ASSAY OF LIPOPROTEIN(A): CPT

## 2021-10-05 PROCEDURE — 82465 ASSAY BLD/SERUM CHOLESTEROL: CPT

## 2021-10-05 PROCEDURE — 36415 COLL VENOUS BLD VENIPUNCTURE: CPT

## 2021-10-06 LAB — LPA SERPL-MCNC: 120 MG/DL

## 2022-01-28 ENCOUNTER — OFFICE VISIT (OUTPATIENT)
Dept: FAMILY MEDICINE | Facility: CLINIC | Age: 67
End: 2022-01-28

## 2022-01-28 VITALS
BODY MASS INDEX: 30.14 KG/M2 | TEMPERATURE: 98 F | SYSTOLIC BLOOD PRESSURE: 106 MMHG | OXYGEN SATURATION: 96 % | WEIGHT: 143.6 LBS | HEART RATE: 74 BPM | DIASTOLIC BLOOD PRESSURE: 66 MMHG | HEIGHT: 58 IN

## 2022-01-28 DIAGNOSIS — I25.119 ATHEROSCLEROSIS OF NATIVE CORONARY ARTERY OF NATIVE HEART WITH ANGINA PECTORIS (H): Primary | ICD-10-CM

## 2022-01-28 DIAGNOSIS — F41.9 ANXIETY: ICD-10-CM

## 2022-01-28 DIAGNOSIS — Z72.0 TOBACCO ABUSE: ICD-10-CM

## 2022-01-28 PROBLEM — I25.10 CORONARY ATHEROSCLEROSIS: Status: ACTIVE | Noted: 2021-05-10

## 2022-01-28 PROCEDURE — 99213 OFFICE O/P EST LOW 20 MIN: CPT | Performed by: FAMILY MEDICINE

## 2022-01-28 RX ORDER — NITROGLYCERIN 0.4 MG/1
0.4 TABLET SUBLINGUAL EVERY 5 MIN PRN
Qty: 25 TABLET | Refills: 1 | Status: SHIPPED | OUTPATIENT
Start: 2022-01-28 | End: 2024-09-09

## 2022-01-28 RX ORDER — EZETIMIBE 10 MG/1
TABLET ORAL
COMMUNITY
Start: 2021-08-12 | End: 2024-09-09

## 2022-01-28 RX ORDER — NITROGLYCERIN 0.4 MG/1
0.4 TABLET SUBLINGUAL EVERY 5 MIN PRN
COMMUNITY
End: 2022-01-28

## 2022-01-28 RX ORDER — VENLAFAXINE HYDROCHLORIDE 37.5 MG/1
37.5 CAPSULE, EXTENDED RELEASE ORAL DAILY
Qty: 90 CAPSULE | Refills: 1 | Status: SHIPPED | OUTPATIENT
Start: 2022-01-28 | End: 2022-06-27

## 2022-01-28 ASSESSMENT — ANXIETY QUESTIONNAIRES
6. BECOMING EASILY ANNOYED OR IRRITABLE: SEVERAL DAYS
7. FEELING AFRAID AS IF SOMETHING AWFUL MIGHT HAPPEN: SEVERAL DAYS
2. NOT BEING ABLE TO STOP OR CONTROL WORRYING: SEVERAL DAYS
GAD7 TOTAL SCORE: 8
IF YOU CHECKED OFF ANY PROBLEMS ON THIS QUESTIONNAIRE, HOW DIFFICULT HAVE THESE PROBLEMS MADE IT FOR YOU TO DO YOUR WORK, TAKE CARE OF THINGS AT HOME, OR GET ALONG WITH OTHER PEOPLE: SOMEWHAT DIFFICULT
1. FEELING NERVOUS, ANXIOUS, OR ON EDGE: MORE THAN HALF THE DAYS
3. WORRYING TOO MUCH ABOUT DIFFERENT THINGS: MORE THAN HALF THE DAYS
5. BEING SO RESTLESS THAT IT IS HARD TO SIT STILL: NOT AT ALL

## 2022-01-28 ASSESSMENT — MIFFLIN-ST. JEOR: SCORE: 1080.09

## 2022-01-28 ASSESSMENT — PATIENT HEALTH QUESTIONNAIRE - PHQ9
5. POOR APPETITE OR OVEREATING: SEVERAL DAYS
SUM OF ALL RESPONSES TO PHQ QUESTIONS 1-9: 9

## 2022-01-28 NOTE — PROGRESS NOTES
"Assessment & Plan   Problem List Items Addressed This Visit        Circulatory    Coronary atherosclerosis - Primary    Relevant Medications    nitroGLYcerin (NITROSTAT) 0.4 MG sublingual tablet       Behavioral    Tobacco abuse    Relevant Orders    CT Chest Lung Cancer Scrn Low Dose wo    Radiology Referral       Other    Anxiety    Relevant Medications    venlafaxine (EFFEXOR-XR) 37.5 MG 24 hr capsule           1. Atherosclerosis of native coronary artery of native heart with angina pectoris (H)  Refilled. Sees cardiology.  - nitroGLYcerin (NITROSTAT) 0.4 MG sublingual tablet; Place 1 tablet (0.4 mg) under the tongue every 5 minutes as needed for chest pain For chest pain place 1 tablet under the tongue every 5 minutes for 3 doses. If symptoms persist 5 minutes after 1st dose call 911.  Dispense: 25 tablet; Refill: 1    2. Anxiety  Controlling her symptoms, refilled.  - venlafaxine (EFFEXOR-XR) 37.5 MG 24 hr capsule; Take 1 capsule (37.5 mg) by mouth daily  Dispense: 90 capsule; Refill: 1    3. Tobacco abuse  Advised to quit. Do lung ct screen.  - CT Chest Lung Cancer Scrn Low Dose wo; Future  - Radiology Referral; Future         Tobacco Cessation:   reports that she has been smoking. She has never used smokeless tobacco.  Tobacco Cessation Action Plan: Information offered: Patient not interested at this time    BMI:   Estimated body mass index is 29.76 kg/m  as calculated from the following:    Height as of this encounter: 1.48 m (4' 10.25\").    Weight as of this encounter: 65.1 kg (143 lb 9.6 oz).         FUTURE APPOINTMENTS:       - Follow-up visit in 6 months    No follow-ups on file.    Dora Marie MD  [facility]    Subjective     Nursing Notes:   Jenn Tong CMA  1/28/2022 10:56 AM  Signed  Chief Complaint   Patient presents with     Recheck Medication     non-fasting today, refill medications     Pre-visit Screening:  Immunizations:  up to date  Colonoscopy:  is up to " "date  Mammogram: is up to date  Asthma Action Test/Plan:  NA  PHQ9:  Done today  GAD7:  Done today  Questioned patient about current smoking habits Pt. Smokes.  Ok to leave detailed message on voice mail for today's visit only Yes, phone # [phone]           Eliza Saenz is a 66 year old female who presents to clinic today for the following health issues   HPI     Here for medication refills. effexor is for anxiety. This is working well for her. Has been on this for 4-5 years. Had been on something else--was previously on selective serotonin reuptake inhibitor. This dose is working well.  Sees cardiology. Needs nitroglycerin        Review of Systems   Constitutional, HEENT, cardiovascular, pulmonary, gi and gu systems are negative, except as otherwise noted.      Objective    /66 (BP Location: Left arm, Patient Position: Sitting, Cuff Size: Adult Large)   Pulse 74   Temp 98  F (36.7  C) (Temporal)   Ht 1.48 m (4' 10.25\")   Wt 65.1 kg (143 lb 9.6 oz)   SpO2 96%   BMI 29.76 kg/m    Body mass index is 29.76 kg/m .  Physical Exam   GENERAL: healthy, alert and no distress  RESP: lungs clear to auscultation - no rales, rhonchi or wheezes  CV: regular rate and rhythm, normal S1 S2, no S3 or S4, no murmur, click or rub, no peripheral edema and peripheral pulses strong  MS: no gross musculoskeletal defects noted, no edema  NEURO: Normal strength and tone, mentation intact and speech normal  PSYCH: mentation appears normal, affect normal/bright    No results found for any visits on 01/28/22.      "

## 2022-01-28 NOTE — PATIENT INSTRUCTIONS
"Assessment & Plan   Problem List Items Addressed This Visit        Circulatory    Coronary atherosclerosis - Primary    Relevant Medications    nitroGLYcerin (NITROSTAT) 0.4 MG sublingual tablet       Behavioral    Tobacco abuse    Relevant Orders    CT Chest Lung Cancer Scrn Low Dose wo    Radiology Referral       Other    Anxiety    Relevant Medications    venlafaxine (EFFEXOR-XR) 37.5 MG 24 hr capsule           1. Atherosclerosis of native coronary artery of native heart with angina pectoris (H)  Refilled. Sees cardiology.  - nitroGLYcerin (NITROSTAT) 0.4 MG sublingual tablet; Place 1 tablet (0.4 mg) under the tongue every 5 minutes as needed for chest pain For chest pain place 1 tablet under the tongue every 5 minutes for 3 doses. If symptoms persist 5 minutes after 1st dose call 911.  Dispense: 25 tablet; Refill: 1    2. Anxiety  Controlling her symptoms, refilled.  - venlafaxine (EFFEXOR-XR) 37.5 MG 24 hr capsule; Take 1 capsule (37.5 mg) by mouth daily  Dispense: 90 capsule; Refill: 1    3. Tobacco abuse  Advised to quit. Do lung ct screen.  - CT Chest Lung Cancer Scrn Low Dose wo; Future  - Radiology Referral; Future         Tobacco Cessation:   reports that she has been smoking. She has never used smokeless tobacco.  Tobacco Cessation Action Plan: Information offered: Patient not interested at this time    BMI:   Estimated body mass index is 29.76 kg/m  as calculated from the following:    Height as of this encounter: 1.48 m (4' 10.25\").    Weight as of this encounter: 65.1 kg (143 lb 9.6 oz).         FUTURE APPOINTMENTS:       - Follow-up visit in 6 months    No follow-ups on file.    Dora Marie MD  [facility]  "

## 2022-01-28 NOTE — NURSING NOTE
Chief Complaint   Patient presents with     Recheck Medication     non-fasting today, refill medications     Pre-visit Screening:  Immunizations:  up to date  Colonoscopy:  is up to date  Mammogram: is up to date  Asthma Action Test/Plan:  NA  PHQ9:  Done today  GAD7:  Done today  Questioned patient about current smoking habits Pt. Smokes.  Ok to leave detailed message on voice mail for today's visit only Yes, phone # [phone]

## 2022-01-29 ASSESSMENT — ANXIETY QUESTIONNAIRES: GAD7 TOTAL SCORE: 8

## 2022-06-11 ENCOUNTER — HEALTH MAINTENANCE LETTER (OUTPATIENT)
Age: 67
End: 2022-06-11

## 2022-06-16 DIAGNOSIS — F41.9 ANXIETY: ICD-10-CM

## 2022-06-16 RX ORDER — VENLAFAXINE HYDROCHLORIDE 37.5 MG/1
CAPSULE, EXTENDED RELEASE ORAL
Qty: 90 CAPSULE | Refills: 3 | COMMUNITY
Start: 2022-06-16

## 2022-06-16 NOTE — TELEPHONE ENCOUNTER
Eliza Saenz is requesting a refill of:    Refused Prescriptions:                       Disp   Refills    venlafaxine (EFFEXOR XR) 37.5 MG 24 hr cap*90 cap*3        Sig: TAKE 1 CAPSULE BY MOUTH  DAILY  Refused By: ANNITA ALEXIS  Reason for Refusal: Patient has requested refill too soon    Refills sent on 01/28/22 for 6 months, this means pt should be ok until end of July, then due for OV

## 2022-06-27 ENCOUNTER — OFFICE VISIT (OUTPATIENT)
Dept: FAMILY MEDICINE | Facility: CLINIC | Age: 67
End: 2022-06-27

## 2022-06-27 DIAGNOSIS — I10 ESSENTIAL HYPERTENSION: ICD-10-CM

## 2022-06-27 DIAGNOSIS — F41.9 ANXIETY: ICD-10-CM

## 2022-06-27 DIAGNOSIS — E78.00 PURE HYPERCHOLESTEROLEMIA: Primary | ICD-10-CM

## 2022-06-27 PROBLEM — N95.2 ATROPHY OF VAGINA: Status: ACTIVE | Noted: 2022-06-27

## 2022-06-27 PROCEDURE — 99213 OFFICE O/P EST LOW 20 MIN: CPT | Mod: 95 | Performed by: FAMILY MEDICINE

## 2022-06-27 RX ORDER — VENLAFAXINE HYDROCHLORIDE 37.5 MG/1
37.5 CAPSULE, EXTENDED RELEASE ORAL DAILY
Qty: 90 CAPSULE | Refills: 1 | Status: SHIPPED | OUTPATIENT
Start: 2022-06-27 | End: 2023-03-07

## 2022-06-27 RX ORDER — ESTRADIOL 0.1 MG/G
CREAM VAGINAL
COMMUNITY
Start: 2022-03-14 | End: 2022-10-31

## 2022-06-27 NOTE — PROGRESS NOTES
"Assessment & Plan   Problem List Items Addressed This Visit        Endocrine    Pure hypercholesterolemia - Primary       Circulatory    Essential hypertension       Other    Anxiety         Video-Visit Details    Type of service:  Video Visit    Video Start Time (time video started): 1:07    Video End Time (time video stopped): 1:25    Originating Location (pt. Location): Home    Distant Location (provider location):  [facility]     Mode of Communication:  Video Conference via doximity  Physician has received verbal consent for a Video Visit from the patient? Yes      Dora Marie MD    1. Pure hypercholesterolemia      2. Essential hypertension      3. Anxiety  Controlled on medications, refilled.  - venlafaxine (EFFEXOR XR) 37.5 MG 24 hr capsule; Take 1 capsule (37.5 mg) by mouth daily  Dispense: 90 capsule; Refill: 1           BMI:   Estimated body mass index is 29.76 kg/m  as calculated from the following:    Height as of 1/28/22: 1.48 m (4' 10.25\").    Weight as of 1/28/22: 65.1 kg (143 lb 9.6 oz).         FUTURE APPOINTMENTS:       - Follow-up visit in 6 months.    No follow-ups on file.    Dora Marie MD  [facility]    Subjective     Nursing Notes:   Jenn Tong CMA  6/27/2022 12:17 PM  Signed  Chief Complaint   Patient presents with     Recheck Medication     Virtual visit: refill venlafaxine, PHQ9 and GAD7 sent via [employer]     Pre-visit Screening:  Immunizations:  up to date  Colonoscopy:  is due and to be scheduled by patient for later completion  Mammogram: is up to date  Asthma Action Test/Plan:  NA  PHQ9:  Done today  GAD7:  Done today  Questioned patient about current smoking habits Pt. Smokes.  Ok to leave detailed message on voice mail for today's visit only Yes, phone # [phone]           Eliza Saenz is a 67 year old female who presents to clinic today for the following health issues   HPI     Video apt to followup on her medications for " anxiety, she is on venlafaxine and this is working well for her at this dose. phq9 reviewed.  She would like to continue the same dose.        Review of Systems   Constitutional, HEENT, cardiovascular, pulmonary, gi and gu systems are negative, except as otherwise noted.      Objective    There were no vitals taken for this visit.  There is no height or weight on file to calculate BMI.  Physical Exam   GENERAL: healthy, alert and no distress  MS: no gross musculoskeletal defects noted, no edema  NEURO: Normal strength and tone, mentation intact and speech normal  PSYCH: mentation appears normal, affect normal/bright  Limited by video

## 2022-06-27 NOTE — NURSING NOTE
Chief Complaint   Patient presents with     Recheck Medication     Virtual visit: refill venlafaxine, PHQ9 and GAD7 sent via [employer]     Pre-visit Screening:  Immunizations:  up to date  Colonoscopy:  is due and to be scheduled by patient for later completion  Mammogram: is up to date  Asthma Action Test/Plan:  NA  PHQ9:  Done today  GAD7:  Done today  Questioned patient about current smoking habits Pt. Smokes.  Ok to leave detailed message on voice mail for today's visit only Yes, phone # [phone]

## 2022-10-16 ENCOUNTER — HEALTH MAINTENANCE LETTER (OUTPATIENT)
Age: 67
End: 2022-10-16

## 2022-10-31 ENCOUNTER — OFFICE VISIT (OUTPATIENT)
Dept: FAMILY MEDICINE | Facility: CLINIC | Age: 67
End: 2022-10-31

## 2022-10-31 VITALS
WEIGHT: 144.6 LBS | SYSTOLIC BLOOD PRESSURE: 118 MMHG | TEMPERATURE: 97.6 F | BODY MASS INDEX: 30.35 KG/M2 | DIASTOLIC BLOOD PRESSURE: 72 MMHG | HEIGHT: 58 IN | HEART RATE: 67 BPM | OXYGEN SATURATION: 97 %

## 2022-10-31 DIAGNOSIS — M54.50 CHRONIC BILATERAL LOW BACK PAIN WITHOUT SCIATICA: Primary | ICD-10-CM

## 2022-10-31 DIAGNOSIS — G89.29 CHRONIC BILATERAL LOW BACK PAIN WITHOUT SCIATICA: Primary | ICD-10-CM

## 2022-10-31 PROCEDURE — 99214 OFFICE O/P EST MOD 30 MIN: CPT | Performed by: FAMILY MEDICINE

## 2022-10-31 RX ORDER — PREDNISONE 20 MG/1
20 TABLET ORAL DAILY
Qty: 5 TABLET | Refills: 0 | Status: SHIPPED | OUTPATIENT
Start: 2022-10-31 | End: 2022-11-05

## 2022-10-31 NOTE — NURSING NOTE
Chief Complaint   Patient presents with     Back Pain     Low back pain for the last few months, pain is an aching sensation that radiates around low back, has been seeing chiropractor, has been doing stretches for this, takes 1 aleve a day      Pre-visit Screening:  Immunizations:  not up to date - prevnar 20 due  Colonoscopy:  is due and to be scheduled by patient for later completion  Mammogram: is up to date  Asthma Action Test/Plan:  NA  PHQ9:  NA  GAD7:  NA  Questioned patient about current smoking habits Pt. smokes .  Ok to leave detailed message on voice mail for today's visit only Yes, phone # [phone]

## 2022-10-31 NOTE — PROGRESS NOTES
"Assessment & Plan   Problem List Items Addressed This Visit    None  Visit Diagnoses     Chronic bilateral low back pain without sciatica    -  Primary    Relevant Medications    predniSONE (DELTASONE) 20 MG tablet    Other Relevant Orders    Physical Therapy Referral           1. Chronic bilateral low back pain without sciatica  Musculoskeletal strain. She doesn't seem to have any referred symptoms. She can try a short course of prednisone but I explained to her that this is not something that we would do repeatedly. I also recommend physical therapy . If not better in 3-4 weeks, come in to be seen, we can then consider a lumbar MRI.   - Physical Therapy Referral  - predniSONE (DELTASONE) 20 MG tablet; Take 1 tablet (20 mg) by mouth daily for 5 days  Dispense: 5 tablet; Refill: 0         BMI:   Estimated body mass index is 29.96 kg/m  as calculated from the following:    Height as of this encounter: 1.48 m (4' 10.25\").    Weight as of this encounter: 65.6 kg (144 lb 9.6 oz).         FUTURE APPOINTMENTS:       - Follow-up visit in 3-4 weeks.    No follow-ups on file.    Dora Marie MD  [facility]    Subjective     Nursing Notes:   Jenn Tong CMA  10/31/2022 12:44 PM  Signed  Chief Complaint   Patient presents with     Back Pain     Low back pain for the last few months, pain is an aching sensation that radiates around low back, has been seeing chiropractor, has been doing stretches for this, takes 1 aleve a day      Pre-visit Screening:  Immunizations:  not up to date - prevnar 20 due  Colonoscopy:  is due and to be scheduled by patient for later completion  Mammogram: is up to date  Asthma Action Test/Plan:  NA  PHQ9:  NA  GAD7:  NA  Questioned patient about current smoking habits Pt. smokes .  Ok to leave detailed message on voice mail for today's visit only Yes, phone # [phone]           Eliza Saenz is a 67 year old female who presents to clinic today for the following health " "issues     HPI     Has been having some issues with her low back, goes to see a chiro and it gets better for awhile but then back.   Wondering about prednisone. To see if this helps. Is not getting better, feels tired all the time. This has been for months. It did at first go down the left leg but got  A brace from chiropractor and now this pain is gone. Will be going on a road trip to [place]. It hurts and aches all the time. When driving, sitting, standing. Also with laundry        Review of Systems   Constitutional, HEENT, cardiovascular, pulmonary, gi and gu systems are negative, except as otherwise noted.      Objective    /72 (BP Location: Right arm, Patient Position: Sitting, Cuff Size: Adult Regular)   Pulse 67   Temp 97.6  F (36.4  C) (Temporal)   Ht 1.48 m (4' 10.25\")   Wt 65.6 kg (144 lb 9.6 oz)   SpO2 97%   BMI 29.96 kg/m    Body mass index is 29.96 kg/m .  Physical Exam   GENERAL: healthy, alert and no distress  MS: no gross musculoskeletal defects noted, no edema  BACK: no CVA tenderness, no paralumbar tenderness  Back: normal rom, LE: neg bilateral straight leg raise, normal LE strength.          "

## 2022-12-09 ENCOUNTER — MYC MEDICAL ADVICE (OUTPATIENT)
Dept: FAMILY MEDICINE | Facility: CLINIC | Age: 67
End: 2022-12-09

## 2022-12-09 DIAGNOSIS — M54.50 CHRONIC BILATERAL LOW BACK PAIN WITHOUT SCIATICA: Primary | ICD-10-CM

## 2022-12-09 DIAGNOSIS — G89.29 CHRONIC BILATERAL LOW BACK PAIN WITHOUT SCIATICA: Primary | ICD-10-CM

## 2022-12-30 DIAGNOSIS — F41.9 ANXIETY: ICD-10-CM

## 2023-01-03 RX ORDER — VENLAFAXINE HYDROCHLORIDE 37.5 MG/1
CAPSULE, EXTENDED RELEASE ORAL
Qty: 90 CAPSULE | Refills: 3 | COMMUNITY
Start: 2023-01-03

## 2023-01-03 NOTE — TELEPHONE ENCOUNTER
Eliza Saenz is requesting a refill of:    Refused Prescriptions:                       Disp   Refills    venlafaxine (EFFEXOR XR) 37.5 MG 24 hr cap*90 cap*3        Sig: TAKE 1 CAPSULE BY MOUTH  DAILY  Refused By: ANNITA ALEXIS  Reason for Refusal: Patient needs appointment    Last med recheck was 06/27/22 advised to return in 6 months. Pt due for OV

## 2023-03-07 ENCOUNTER — OFFICE VISIT (OUTPATIENT)
Dept: FAMILY MEDICINE | Facility: CLINIC | Age: 68
End: 2023-03-07

## 2023-03-07 VITALS
HEART RATE: 64 BPM | SYSTOLIC BLOOD PRESSURE: 118 MMHG | WEIGHT: 145.4 LBS | BODY MASS INDEX: 30.52 KG/M2 | HEIGHT: 58 IN | DIASTOLIC BLOOD PRESSURE: 74 MMHG | OXYGEN SATURATION: 97 % | TEMPERATURE: 98 F

## 2023-03-07 DIAGNOSIS — Z23 ENCOUNTER FOR VACCINATION: ICD-10-CM

## 2023-03-07 DIAGNOSIS — Z00.00 INITIAL MEDICARE ANNUAL WELLNESS VISIT: ICD-10-CM

## 2023-03-07 DIAGNOSIS — F41.9 ANXIETY: Primary | ICD-10-CM

## 2023-03-07 PROCEDURE — 90677 PCV20 VACCINE IM: CPT | Performed by: FAMILY MEDICINE

## 2023-03-07 PROCEDURE — 99214 OFFICE O/P EST MOD 30 MIN: CPT | Mod: 25 | Performed by: FAMILY MEDICINE

## 2023-03-07 PROCEDURE — G0009 ADMIN PNEUMOCOCCAL VACCINE: HCPCS | Performed by: FAMILY MEDICINE

## 2023-03-07 PROCEDURE — G0438 PPPS, INITIAL VISIT: HCPCS | Performed by: FAMILY MEDICINE

## 2023-03-07 ASSESSMENT — ANXIETY QUESTIONNAIRES
GAD7 TOTAL SCORE: 16
1. FEELING NERVOUS, ANXIOUS, OR ON EDGE: NEARLY EVERY DAY
3. WORRYING TOO MUCH ABOUT DIFFERENT THINGS: NEARLY EVERY DAY
IF YOU CHECKED OFF ANY PROBLEMS ON THIS QUESTIONNAIRE, HOW DIFFICULT HAVE THESE PROBLEMS MADE IT FOR YOU TO DO YOUR WORK, TAKE CARE OF THINGS AT HOME, OR GET ALONG WITH OTHER PEOPLE: SOMEWHAT DIFFICULT
GAD7 TOTAL SCORE: 16
7. FEELING AFRAID AS IF SOMETHING AWFUL MIGHT HAPPEN: MORE THAN HALF THE DAYS
6. BECOMING EASILY ANNOYED OR IRRITABLE: MORE THAN HALF THE DAYS
2. NOT BEING ABLE TO STOP OR CONTROL WORRYING: MORE THAN HALF THE DAYS
5. BEING SO RESTLESS THAT IT IS HARD TO SIT STILL: MORE THAN HALF THE DAYS

## 2023-03-07 ASSESSMENT — PATIENT HEALTH QUESTIONNAIRE - PHQ9
5. POOR APPETITE OR OVEREATING: MORE THAN HALF THE DAYS
SUM OF ALL RESPONSES TO PHQ QUESTIONS 1-9: 16

## 2023-03-07 NOTE — NURSING NOTE
Chief Complaint   Patient presents with     Recheck Medication     Discuss changing anxiety medication, currently taking venlafaxine     Fatigue     Discuss fatigue, sleeps well at night and then exhausted during the day ,possibly check iron and thyroid     Pre-visit Screening:  Immunizations:  not up to date - prevnar 20 due  Colonoscopy:  is due and to be scheduled by patient for later completion  Mammogram: is up to date  Asthma Action Test/Plan:  NA  PHQ9:  NA  GAD7:  NA  Questioned patient about current smoking habits Pt. smokes .  Ok to leave detailed message on voice mail for today's visit only Yes, phone # [phone]

## 2023-03-07 NOTE — PROGRESS NOTES
Assessment & Plan   Problem List Items Addressed This Visit        Other    Anxiety - Primary    Relevant Medications    sertraline (ZOLOFT) 50 MG tablet   Other Visit Diagnoses     Encounter for vaccination        Relevant Orders    PNEUMOCOCCAL 20 VALENT CONJUGATE (PREVNAR 20) (Completed)    Initial Medicare annual wellness visit             1. Anxiety  Stop venlafaxine, start sertraline, side effects including FDA warning discussed. Recheck in 3-4 weeks.  - sertraline (ZOLOFT) 50 MG tablet; Take 1 tablet (50 mg) by mouth daily Take 25 mg/day for a week then increase to 50 mg/day  Dispense: 90 tablet; Refill: 0    2. Encounter for vaccination    - PNEUMOCOCCAL 20 VALENT CONJUGATE (PREVNAR 20)    3. Initial Medicare annual wellness visit  Completed.           Nicotine/Tobacco Cessation:  She reports that she has been smoking. She has never used smokeless tobacco.  Nicotine/Tobacco Cessation Plan:   Information offered: Patient not interested at this time        FUTURE APPOINTMENTS:       - Follow-up visit in 3-4 weeks    No follow-ups on file.    Dora Marie MD  [facility]    Subjective     Nursing Notes:   Jenn Tong CMA  3/7/2023  1:16 PM  Signed  Chief Complaint   Patient presents with     Recheck Medication     Discuss changing anxiety medication, currently taking venlafaxine     Fatigue     Discuss fatigue, sleeps well at night and then exhausted during the day ,possibly check iron and thyroid     Pre-visit Screening:  Immunizations:  not up to date - prevnar 20 due  Colonoscopy:  is due and to be scheduled by patient for later completion  Mammogram: is up to date  Asthma Action Test/Plan:  NA  PHQ9:  NA  GAD7:  NA  Questioned patient about current smoking habits Pt. smokes .  Ok to leave detailed message on voice mail for today's visit only Yes, phone # [phone]           Eliza Saenz is a 67 year old female who presents to clinic today for the following health issues  "    HPI     Here with her sister for anxiety. Is on velafaxine, has both depression but the anxiety is worse. Has been on this medication for 5-10 years . Hasn't taken any others In the past. Brought it paperwork recommending buspar and sertraline.   Also is feeling tired lately. Wants blood work next month when she returns.          Review of Systems   Constitutional, HEENT, cardiovascular, pulmonary, gi and gu systems are negative, except as otherwise noted.      Objective    /74 (BP Location: Right arm, Patient Position: Sitting, Cuff Size: Adult Large)   Pulse 64   Temp 98  F (36.7  C) (Temporal)   Ht 1.48 m (4' 10.25\")   Wt 66 kg (145 lb 6.4 oz)   SpO2 97%   BMI 30.13 kg/m    Body mass index is 30.13 kg/m .  Physical Exam   GENERAL: healthy, alert and no distress  RESP: lungs clear to auscultation - no rales, rhonchi or wheezes  CV: regular rate and rhythm, normal S1 S2, no S3 or S4, no murmur, click or rub, no peripheral edema and peripheral pulses strong  MS: no gross musculoskeletal defects noted, no edema  NEURO: Normal strength and tone, mentation intact and speech normal  PSYCH: mentation appears normal, affect normal/bright    No results found for any visits on 03/07/23.      "

## 2023-03-07 NOTE — PROGRESS NOTES
Eliza Saenz is a 67 year old female who presents for Medicare Annual Wellness Visit.    Current providers caring for this patient include:  Patient Care Team:  Dora Marie MD as PCP - General (Family Medicine)  Dora Marie MD as Assigned PCP    Complete Medical and Social history reviewed with patient, outlined below.    Patient Active Problem List   Diagnosis     Tobacco abuse     Research study patient     Pure hypercholesterolemia     Migraine headache     Palpitations     Essential hypertension     SAEED (dyspnea on exertion)     Depression     Coronary atherosclerosis--followed by cardiology     Carotid stenosis, bilateral     Abnormal cardiovascular stress test     ACP (advance care planning)     Health Care Home     Anxiety     Atrophy of vagina       No past medical history on file.    Past Surgical History:   Procedure Laterality Date     CAROTID ENDARTERECTOMY Bilateral       SECTION       FOOT SURGERY Left      HEART CATH STENT COR W/WO [facility]  2019     SHOULDER SURGERY Right      WRIST SURGERY Left        Family History   Problem Relation Age of Onset     Heart Disease Mother      Depression Mother      Hypertension Mother      Alcoholism Father      Heart Disease Father      Skin Cancer Sister      Heart Disease Brother      Coronary Artery Disease Brother      Cerebrovascular Disease Brother        Social History     Tobacco Use     Smoking status: Every Day     Smokeless tobacco: Never   Substance Use Topics     Alcohol use: Not on file       Diet: regular, low salt/low fat  Physical Activity: generally inactive  Depression Screen:    Over the past 2 weeks, patient has felt down, depressed, or hopeless:  Yes    Over the past 2 weeks, patient has felt little interest or pleasure in doing things: Yes    Functional ability/Safety screen:  Up and go test (able to get up and walk longer than 30 seconds): Passed  Patient needs assistance with: nothing  Patient's home has the following  "possible safety concerns: none identified  Patient has concerns about her hearing:  No  Cognitive Screen  Patient repeats three objects (ball, flag, tree)      Clock drawing test:   NORMAL  Recalls three objects after 3 minutes (ball,flag,tree):                             recalls 3 objects (3 points)    Physical Exam:  /74 (BP Location: Right arm, Patient Position: Sitting, Cuff Size: Adult Large)   Pulse 64   Temp 98  F (36.7  C) (Temporal)   Ht 1.48 m (4' 10.25\")   Wt 66 kg (145 lb 6.4 oz)   SpO2 97%   BMI 30.13 kg/m     Body mass index is 30.13 kg/m .     Health Maintenance   Topic Date Due     LUNG CANCER SCREENING  04/01/2023 (Originally 6/15/2004)     DEXA  05/12/2023     FALL RISK ASSESSMENT  06/27/2023     MAMMO SCREENING  07/12/2023     NICOTINE/TOBACCO CESSATION COUNSELING Q 1 YR  03/07/2024     MEDICARE ANNUAL WELLNESS VISIT  03/07/2024     DTAP/TDAP/TD IMMUNIZATION (2 - Td or Tdap) 03/20/2024     ADVANCE CARE PLANNING  06/11/2026     LIPID  10/05/2026     HEPATITIS C SCREENING  Completed     PHQ-2 (once per calendar year)  Completed     INFLUENZA VACCINE  Completed     Pneumococcal Vaccine: 65+ Years  Completed     ZOSTER IMMUNIZATION  Completed     COVID-19 Vaccine  Completed     IPV IMMUNIZATION  Aged Out     MENINGITIS IMMUNIZATION  Aged Out     COLORECTAL CANCER SCREENING  Discontinued             End of Life Planning:   Patient currently has an advanced directive: No.  I have verified the patient's ablity to prepare an advanced directive/make health care decisions.  Literature was provided to assist patient in preparing an advanced directive.    Education/Counseling:   Based on review of the above information, the following items were addressed:      Healthy diet, regular exercise    Appropriate preventive services were discussed with this patient, including applicable screening as appropriate for cardiovascular disease, diabetes, osteopenia/osteoporosis, and glaucoma.  As appropriate " for age/gender, discussed screening for colorectal cancer, prostate cancer, breast cancer, and cervical cancer.   Checklist reviewing preventive services available has been given to the patient.

## 2023-05-08 NOTE — TELEPHONE ENCOUNTER
Patient called asking that we send the referral, office note, imaging to [facility] for her appointment 8:20am 5.12.2023 with     Dr. Chin Van   [facility]  [address]  [place] [address]  [phone] -- appt line  [phone] -- fax    I let patient know that the referral, office note, mri report has been sent to Dr. Van care team. I also called [facility] -- talked with Gabriella in medical records to have the MRI that was done 12.9.2022 sent/pushed to [facility].

## 2023-05-22 ENCOUNTER — TRANSFERRED RECORDS (OUTPATIENT)
Dept: FAMILY MEDICINE | Facility: CLINIC | Age: 68
End: 2023-05-22

## 2023-05-22 DIAGNOSIS — F41.9 ANXIETY: ICD-10-CM

## 2023-05-22 NOTE — TELEPHONE ENCOUNTER
Eliza Saenz is requesting a refill of:    Refused Prescriptions:                       Disp   Refills    sertraline (ZOLOFT) 50 MG tablet [Pharmacy*90 tab*3        Sig: TAKE ONE-HALF (1/2) TABLET (25 MG) PER DAY FOR A WEEK           THEN INCREASE TO 1 TABLET DAILY THEREAFTER  Refused By: ANNITA ALEXIS  Reason for Refusal: Patient needs appointment    Pt called stating sertraline is not working, advised she needs OV   Onset: 4 days    Location / description: [place] is concerned about possible food poisoning.  He ate fish and onions 4 days ago.  He is unsure if it was spoiled.    Severe constant abdominal pain for 4 days  Diarrhea, 5-6 times per day. Today was soft, not liquid  Denies fever  Urinating ok, increased frequency    Precipitating Factors: None  Pain Scale (1 - 10), 10 highest: 7/10, constant  Associated Symptoms: Above  What  improves / worsens symptoms: Nothing  Symptom specific medications: None  Recent Care: Unknown, No PCP    PLAN:  Directed to Emergency Department    Patient/Caller agrees to follow recommendations.    Reason for Disposition  • [1] Constant abdominal pain AND [2] present > 2 hours    Protocols used: DIARRHEA-A-AH

## 2023-05-22 NOTE — PROGRESS NOTES
"Assessment & Plan   Problem List Items Addressed This Visit        Other    Anxiety    Relevant Medications    venlafaxine (EFFEXOR XR) 37.5 MG 24 hr capsule        1. Anxiety  Stop selective serotonin reuptake inhibitor, change to effexor, side effects including FDA warning discussed. recheck in 6 months.  - venlafaxine (EFFEXOR XR) 37.5 MG 24 hr capsule; Take 1 capsule (37.5 mg) by mouth daily  Dispense: 90 capsule; Refill: 1         BMI:   Estimated body mass index is 30.09 kg/m  as calculated from the following:    Height as of this encounter: 1.48 m (4' 10.25\").    Weight as of this encounter: 65.9 kg (145 lb 3.2 oz).         FUTURE APPOINTMENTS:       - Follow-up visit in  6months.    No follow-ups on file.    Dora Marie MD  [facility]    Subjective     Nursing Notes:   Jenn Tong, [facility]  5/24/2023  2:52 PM  Signed  Chief Complaint   Patient presents with     Recheck Medication     Recheck on sertraline, does not feel this is working well, this medication was making her heart race and did not help with anxiety      Pre-visit Screening:  Immunizations:  up to date  Colonoscopy:  is up to date  Mammogram: is up to date  Asthma Action Test/Plan: NA  PHQ9:  NA  GAD7:  NA  Questioned patient about current smoking habits Pt. smokes   Ok to leave detailed message on voice mail for today's visit only Yes, phone # [phone]           Eliza Saenz is a 67 year old female who presents to clinic today for the following health issues     HPI     Here with her sister to [place] on her depression medications. She changed because she didn't think that the effexor was working. But she doesn't think the selective serotonin reuptake inhibitor is working, so now she wants to go back to the effexor. Has some ups and downs.        Review of Systems   Constitutional, HEENT, cardiovascular, pulmonary, gi and gu systems are negative, except as otherwise noted.      Objective    /72 (BP Location: " "Left arm, Patient Position: Sitting, Cuff Size: Adult Large)   Pulse 65   Temp 98.3  F (36.8  C) (Temporal)   Ht 1.48 m (4' 10.25\")   Wt 65.9 kg (145 lb 3.2 oz)   SpO2 98%   BMI 30.09 kg/m    Body mass index is 30.09 kg/m .  Physical Exam   GENERAL: healthy, alert and no distress  MS: no gross musculoskeletal defects noted, no edema  NEURO: Normal strength and tone, mentation intact and speech normal  PSYCH: mentation appears normal, affect normal/bright    No results found for any visits on 05/24/23.      "

## 2023-05-24 ENCOUNTER — OFFICE VISIT (OUTPATIENT)
Dept: FAMILY MEDICINE | Facility: CLINIC | Age: 68
End: 2023-05-24

## 2023-05-24 VITALS
BODY MASS INDEX: 30.48 KG/M2 | WEIGHT: 145.2 LBS | TEMPERATURE: 98.3 F | SYSTOLIC BLOOD PRESSURE: 116 MMHG | OXYGEN SATURATION: 98 % | DIASTOLIC BLOOD PRESSURE: 72 MMHG | HEART RATE: 65 BPM | HEIGHT: 58 IN

## 2023-05-24 DIAGNOSIS — F41.9 ANXIETY: ICD-10-CM

## 2023-05-24 PROCEDURE — 99214 OFFICE O/P EST MOD 30 MIN: CPT | Performed by: FAMILY MEDICINE

## 2023-05-24 RX ORDER — VENLAFAXINE HYDROCHLORIDE 37.5 MG/1
37.5 CAPSULE, EXTENDED RELEASE ORAL DAILY
Qty: 90 CAPSULE | Refills: 1 | Status: SHIPPED | OUTPATIENT
Start: 2023-05-24 | End: 2023-05-24

## 2023-05-24 RX ORDER — VENLAFAXINE HYDROCHLORIDE 37.5 MG/1
37.5 CAPSULE, EXTENDED RELEASE ORAL DAILY
Qty: 90 CAPSULE | Refills: 1 | Status: SHIPPED | OUTPATIENT
Start: 2023-05-24 | End: 2023-11-13

## 2023-05-24 ASSESSMENT — ANXIETY QUESTIONNAIRES
5. BEING SO RESTLESS THAT IT IS HARD TO SIT STILL: SEVERAL DAYS
6. BECOMING EASILY ANNOYED OR IRRITABLE: SEVERAL DAYS
IF YOU CHECKED OFF ANY PROBLEMS ON THIS QUESTIONNAIRE, HOW DIFFICULT HAVE THESE PROBLEMS MADE IT FOR YOU TO DO YOUR WORK, TAKE CARE OF THINGS AT HOME, OR GET ALONG WITH OTHER PEOPLE: SOMEWHAT DIFFICULT
7. FEELING AFRAID AS IF SOMETHING AWFUL MIGHT HAPPEN: MORE THAN HALF THE DAYS
1. FEELING NERVOUS, ANXIOUS, OR ON EDGE: NEARLY EVERY DAY
3. WORRYING TOO MUCH ABOUT DIFFERENT THINGS: NEARLY EVERY DAY
GAD7 TOTAL SCORE: 15
2. NOT BEING ABLE TO STOP OR CONTROL WORRYING: NEARLY EVERY DAY
GAD7 TOTAL SCORE: 15

## 2023-05-24 ASSESSMENT — PATIENT HEALTH QUESTIONNAIRE - PHQ9
SUM OF ALL RESPONSES TO PHQ QUESTIONS 1-9: 15
5. POOR APPETITE OR OVEREATING: MORE THAN HALF THE DAYS

## 2023-05-24 NOTE — NURSING NOTE
Chief Complaint   Patient presents with     Recheck Medication     Recheck on sertraline, does not feel this is working well, this medication was making her heart race and did not help with anxiety      Pre-visit Screening:  Immunizations:  up to date  Colonoscopy:  is up to date  Mammogram: is up to date  Asthma Action Test/Plan: NA  PHQ9:  NA  GAD7:  NA  Questioned patient about current smoking habits Pt. smokes   Ok to leave detailed message on voice mail for today's visit only Yes, phone # [phone]

## 2023-06-16 ENCOUNTER — TRANSFERRED RECORDS (OUTPATIENT)
Dept: FAMILY MEDICINE | Facility: CLINIC | Age: 68
End: 2023-06-16

## 2023-08-03 ENCOUNTER — TRANSFERRED RECORDS (OUTPATIENT)
Dept: FAMILY MEDICINE | Facility: CLINIC | Age: 68
End: 2023-08-03

## 2023-09-13 ENCOUNTER — TRANSFERRED RECORDS (OUTPATIENT)
Dept: FAMILY MEDICINE | Facility: CLINIC | Age: 68
End: 2023-09-13

## 2023-09-18 ENCOUNTER — OFFICE VISIT (OUTPATIENT)
Dept: FAMILY MEDICINE | Facility: CLINIC | Age: 68
End: 2023-09-18

## 2023-09-18 VITALS
TEMPERATURE: 97.5 F | DIASTOLIC BLOOD PRESSURE: 78 MMHG | HEIGHT: 58 IN | BODY MASS INDEX: 30.64 KG/M2 | WEIGHT: 146 LBS | HEART RATE: 70 BPM | OXYGEN SATURATION: 98 % | SYSTOLIC BLOOD PRESSURE: 122 MMHG

## 2023-09-18 DIAGNOSIS — M18.11 DEGENERATIVE ARTHRITIS OF THUMB, RIGHT: Primary | ICD-10-CM

## 2023-09-18 DIAGNOSIS — Z01.818 PRE-OP EXAM: ICD-10-CM

## 2023-09-18 PROCEDURE — 93000 ELECTROCARDIOGRAM COMPLETE: CPT | Performed by: PHYSICIAN ASSISTANT

## 2023-09-18 PROCEDURE — 99214 OFFICE O/P EST MOD 30 MIN: CPT | Performed by: PHYSICIAN ASSISTANT

## 2023-09-18 NOTE — PROGRESS NOTES
[facility]YS[facility]  [address] [place] [address]  [address]  [facility] [address]  Phone: [phone]  Fax: [phone]  Primary Provider: Dora Marie  Pre-op Performing Provider: PUNEET STARR      PREOPERATIVE EVALUATION:  Today's date: 9/18/2023    Eliza Saenz is a 68 year old female who presents for a preoperative evaluation.    Surgical Information:  Surgery/Procedure: right thumb surgery  Surgery Location: [facility]  Surgeon: Dr. Harmon  Surgery Date: 9/26/23  Time of Surgery: TBD  Where patient plans to recover: At home with family and At home alone  Fax number for surgical facility: [phone]    Assessment & Plan     The proposed surgical procedure is considered INTERMEDIATE risk.    Degenerative arthritis of thumb, right  Proceed with surgery at surgeon's discretion.    - EKG 12-lead complete w/read - Clinics    Pre-op exam    - EKG 12-lead complete w/read - Clinics             - No identified additional risk factors other than previously addressed    Antiplatelet or Anticoagulation Medication Instructions:  ASA/plavix: hold for 8 days before surgery per Dr. Harmon      Additional Medication Instructions:  Hold all meds until after surgery -normal nighttime meds    RECOMMENDATION:  APPROVAL GIVEN to proceed with proposed procedure, without further diagnostic evaluation.      Subjective       HPI related to upcoming procedure: Arthritis of R thumb    1. No - Have you ever had a heart attack or stroke?  2. Yes - Have you ever had surgery on your heart or blood vessels, such as a stent, coronary (heart) bypass, or surgery on an artery in the head, neck, heart, or legs? 4 stents in circumflex artery, had vascular procedure on JACKIE carotid arteries due to stenosis  3. No - Do you have chest pain when you are physically active?  4. No - Do you have a history of heart failure?  5. No - Do you currently have a cold, bronchitis, or symptoms of other respiratory (head and chest)  infections?  6. No - Do you have a cough, shortness of breath, or wheezing?  7. No - Do you or anyone in your family have a history of blood clots?  8. No - Do you or anyone in your family have a serious bleeding problem, such as long-lasting bleeding after surgeries or cuts?  9. No - Have you ever had anemia or been told to take iron pills?  10. No - Have you had any abnormal blood loss such as black, tarry or bloody stools, or abnormal vaginal bleeding?  11. No - Have you ever had a blood transfusion?  12. Yes - Are you willing to have a blood transfusion if it is medically needed before, during, or after your surgery?  13. No - Have you or anyone in your family ever had problems with anesthesia (sedation for surgery)?  14. No - Do you have sleep apnea, excessive snoring, or daytime drowsiness?   15. No - Do you have any artifical heart valves or other implanted medical devices, such as a pacemaker, defibrillator, or continuous glucose monitor?  16. No - Do you have any artifical joints?  17. No - Are you allergic to latex?  18. No - Is there any chance that you may be pregnant?      Health Care Directive:  Patient does not have a Health Care Directive or Living Will: Discussed advance care planning with patient; however, patient declined at this time.    Preoperative Review of :   reviewed - no record of controlled substances prescribed.      Status of Chronic Conditions:  CAD - Patient has a longstanding history of moderate-severe CAD. Patient denies recent chest pain or NTG use, denies exercise induced dyspnea or PND. Last Stress test none, not needed per cardiology, EKG several years ago (approx 2).     HYPERLIPIDEMIA - Patient has a long history of significant Hyperlipidemia requiring medication for treatment with recent good control. Patient reports no problems or side effects with the medication.     HTN: good control, on metoprolol.    Review of Systems  CONSTITUTIONAL: NEGATIVE for fever, chills,  change in weight  INTEGUMENTARY/SKIN: NEGATIVE for worrisome rashes, moles or lesions  EYES: NEGATIVE for vision changes or irritation  ENT/MOUTH: NEGATIVE for ear, mouth and throat problems  RESP: NEGATIVE for significant cough or SOB  CV: NEGATIVE for chest pain, palpitations or peripheral edema  GI: NEGATIVE for nausea, abdominal pain, heartburn, or change in bowel habits  : NEGATIVE for frequency, dysuria, or hematuria  NEURO: NEGATIVE for weakness, dizziness or paresthesias  ENDOCRINE: NEGATIVE for temperature intolerance, skin/hair changes  HEME: NEGATIVE for bleeding problems  PSYCHIATRIC: NEGATIVE for changes in mood or affect    Patient Active Problem List    Diagnosis Date Noted    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     Priority: Medium    Palpitations 05/10/2021     Priority: Medium    SAEED (dyspnea on exertion) 05/10/2021     Priority: Medium    Coronary atherosclerosis--followed by cardiology 05/10/2021     Priority: Medium     S/P stenting of Circ  May 21st, 2019      Abnormal cardiovascular stress test 05/10/2021     Priority: Medium    ACP (advance care planning) 05/10/2021     Priority: Medium    Health Care Home 05/10/2021     Priority: Medium    Anxiety 05/10/2021     Priority: Medium    Research study patient 05/21/2019     Priority: Medium     A post-approval study of the Medtronic Resolute TALISHA Zotoroliums-Eluting Coronary Stent System  To assess the continued safety and efficacy of the Resolute Daisy stent for the treatment of lesions in the coronary arteries amenable to treatment with a Resolute Daisy 2.0mm-5.0mm. If hospitalized for cardiac reasons please contact     -  aDniela Copeland phone: [phone]      Essential hypertension 09/08/2016     Priority: Medium    Tobacco abuse 09/07/2016     Priority: Medium    Pure hypercholesterolemia 09/07/2016     Priority: Medium    Migraine headache 09/07/2016     Priority: Medium    Depression 09/07/2016     Priority:  "Medium    Carotid stenosis, bilateral 2016     Priority: Medium      No past medical history on file.  Past Surgical History:   Procedure Laterality Date    CAROTID ENDARTERECTOMY Bilateral      SECTION      FOOT SURGERY Left     HEART CATH STENT COR W/WO [facility]  2019    SHOULDER SURGERY Right     WRIST SURGERY Left      Current Outpatient Medications   Medication Sig Dispense Refill    aspirin 81 MG EC tablet Take 81 mg by mouth daily      atorvastatin (LIPITOR) 80 MG tablet Take 80 mg by mouth Filled by Cardiology      clopidogrel (PLAVIX) 75 MG tablet Take 75 mg by mouth Filled by Cardiology      ezetimibe (ZETIA) 10 MG tablet Filled by Cardiology      metoprolol succinate ER (TOPROL-XL) 25 MG 24 hr tablet Take 25 mg by mouth Filled by Cardiology      venlafaxine (EFFEXOR XR) 37.5 MG 24 hr capsule Take 1 capsule (37.5 mg) by mouth daily 90 capsule 1    nitroGLYcerin (NITROSTAT) 0.4 MG sublingual tablet Place 1 tablet (0.4 mg) under the tongue every 5 minutes as needed for chest pain For chest pain place 1 tablet under the tongue every 5 minutes for 3 doses. If symptoms persist 5 minutes after 1st dose call 911. 25 tablet 1       Allergies   Allergen Reactions    Penicillins Hives    Sulfa Antibiotics Hives    Tobramycin Rash        Social History     Tobacco Use    Smoking status: Every Day     Passive exposure: Never    Smokeless tobacco: Never   Substance Use Topics    Alcohol use: Never       History   Drug Use Unknown         Objective     /78 (BP Location: Left arm, Patient Position: Sitting, Cuff Size: Adult Regular)   Pulse 70   Temp 97.5  F (36.4  C) (Temporal)   Ht 1.473 m (4' 10\")   Wt 66.2 kg (146 lb)   SpO2 98%   BMI 30.51 kg/m      Physical Exam    GENERAL APPEARANCE: healthy, alert and no distress     EYES: EOMI, PERRL     HENT: ear canals and TM's normal and nose and mouth without ulcers or lesions     NECK: no adenopathy, no asymmetry, masses, or scars and thyroid normal to " palpation     RESP: lungs clear to auscultation - no rales, rhonchi or wheezes     CV: regular rates and rhythm, normal S1 S2, no S3 or S4 and no murmur, click or rub     ABDOMEN:  soft, nontender, no HSM or masses and bowel sounds normal     MS: extremities normal- no gross deformities noted, no evidence of inflammation in joints, FROM in all extremities.     SKIN: no suspicious lesions or rashes     NEURO: Normal strength and tone, sensory exam grossly normal, mentation intact and speech normal     PSYCH: mentation appears normal. and affect normal/bright     LYMPHATICS: No cervical adenopathy    No results for input(s): HGB, PLT, INR, NA, POTASSIUM, CR, A1C in the last 19227 hours.     Labs 8/9/23  eGFR 68  Cr .91    Diagnostics:  No labs were ordered during this visit.   EKG: appears normal, NSR, normal axis, normal intervals, no acute ST/T changes c/w ischemia, no LVH by voltage criteria, there are no prior tracings available    Revised Cardiac Risk Index (RCRI):  The patient has the following serious cardiovascular risks for perioperative complications:   - Coronary Artery Disease (MI, positive stress test, angina, Qs on EKG) = 1 point     RCRI Interpretation: 1 point: Class II (low risk - 0.9% complication rate)         Signed Electronically by: Rhett Vieyra PA-C  Copy of this evaluation report is provided to requesting physician.

## 2023-10-11 ENCOUNTER — TRANSFERRED RECORDS (OUTPATIENT)
Dept: FAMILY MEDICINE | Facility: CLINIC | Age: 68
End: 2023-10-11

## 2023-11-04 ENCOUNTER — HEALTH MAINTENANCE LETTER (OUTPATIENT)
Age: 68
End: 2023-11-04

## 2023-11-08 NOTE — PROGRESS NOTES
"Assessment & Plan   Problem List Items Addressed This Visit          Other    Anxiety - Primary    Relevant Medications    venlafaxine (EFFEXOR XR) 75 MG 24 hr capsule     Other Visit Diagnoses       Chronic right-sided low back pain with right-sided sciatica        Relevant Orders    Orthopedic  Referral    Personal history of tobacco use        Relevant Orders    Prof fee: Shared Decision Making for Lung Cancer Screening (Completed)    CT Chest Lung Cancer Scrn Low Dose wo    Radiology Referral    Screening for osteoporosis        Relevant Orders    Radiology Referral    Dexa hip/pelvis/spine*           1. Anxiety  Increase dose of effexor, recheck in  6months fda warning discussed.  - venlafaxine (EFFEXOR XR) 75 MG 24 hr capsule; Take 1 capsule (75 mg) by mouth daily  Dispense: 90 capsule; Refill: 1    2. Chronic right-sided low back pain with right-sided sciatica  Chronic, she wants to see a different ortho doctor for another opinion. Referral done.  - Orthopedic  Referral    3. Personal history of tobacco use  Advantages/disadvantages discussed.  - Prof fee: Shared Decision Making for Lung Cancer Screening  - CT Chest Lung Cancer Scrn Low Dose wo  - Radiology Referral    4. Screening for osteoporosis    - Radiology Referral  - Dexa hip/pelvis/spine*            BMI:   Estimated body mass index is 30.72 kg/m  as calculated from the following:    Height as of this encounter: 1.473 m (4' 10\").    Weight as of this encounter: 66.7 kg (147 lb).         FUTURE APPOINTMENTS:       - Follow-up visit in 3-4 weeks recheck.    No follow-ups on file.    Dora Marie MD  [facility] PHYSICIANS    Subjective     Nursing Notes:   Juana Snow  11/13/2023  9:55 AM  Addendum  Chief Complaint   Patient presents with    Recheck Medication     Anxiety med recheck    Referral     Referral for MRI- left hip and back pain then will go to [facility]         Pre-visit Screening:  Immunizations:  up to " "date  Colonoscopy:  UTD  Mammogram: UTD  Asthma Action Test/Plan:  NA  PHQ9:  Done today  GAD7:  Done today  Questioned patient about current smoking habits Pt. Smokes 20 cigs per day  Ok to leave detailed message on voice mail for today's visit only Yes, phone # [phone]         Eliza Saenz is a 68 year old female who presents to clinic today for the following health issues   HPI     Here with sister.   Having more anxiety, sometimes taking 2 pills per day. Denies SI. Wouldn't hurt herself, is [employer]. ;    Chronic low back pain. Has spinal stenosis/spondylosis, had an MRI and saw Dr. Van. Got an injection, this seemed to work for 2-3 weeks. Called him back and told him this didn't really work. Scheduled him for a second shot. So didn't get this shot since end of July. Had some difficulty getting ahold of him for a recheck.   Now having pain in the right leg and going down the leg.         Review of Systems   Constitutional, HEENT, cardiovascular, pulmonary, gi and gu systems are negative, except as otherwise noted.      Objective    /70 (BP Location: Right arm, Patient Position: Sitting, Cuff Size: Adult Regular)   Pulse 70   Temp 97.1  F (36.2  C) (Temporal)   Ht 1.473 m (4' 10\")   Wt 66.7 kg (147 lb)   SpO2 96%   BMI 30.72 kg/m    Body mass index is 30.72 kg/m .  Physical Exam   GENERAL: healthy, alert and no distress  MS: no gross musculoskeletal defects noted, no edema  NEURO: Normal strength and tone, mentation intact and speech normal  PSYCH: mentation appears normal, affect normal/bright    No results found for any visits on 11/13/23.    Lung Cancer Screening Shared Decision Making Visit     Eliza Saenz, a 68 year old female, is eligible for lung cancer screening    History   Smoking Status    Every Day   Smokeless Tobacco    Never       I have discussed with patient the risks and benefits of screening for lung cancer with low-dose CT.     The risks include:    radiation exposure: " one low dose chest CT has as much ionizing radiation as about 15 chest x-rays, or 6 months of background radiation living in [place]      false positives: most findings/nodules are NOT cancer, but some might still require additional diagnostic evaluation, including biopsy    over-diagnosis: some slow growing cancers that might never have been clinically significant will be detected and treated unnecessarily     The benefit of early detection of lung cancer is contingent upon adherence to annual screening or more frequent follow up if indicated.     Furthermore, to benefit from screening, Eliza must be willing and able to undergo diagnostic procedures, if indicated. Although no specific guide is available for determining severity of comorbidities, it is reasonable to withhold screening in patients who have greater mortality risk from other diseases.     We did discuss that the best way to prevent lung cancer is to not smoke.    Some patients may value a numeric estimation of lung cancer risk when evaluating if lung cancer screening is right for them, here is one calculator:    ShouldIScreen

## 2023-11-13 ENCOUNTER — OFFICE VISIT (OUTPATIENT)
Dept: FAMILY MEDICINE | Facility: CLINIC | Age: 68
End: 2023-11-13

## 2023-11-13 VITALS
OXYGEN SATURATION: 96 % | SYSTOLIC BLOOD PRESSURE: 112 MMHG | TEMPERATURE: 97.1 F | BODY MASS INDEX: 30.86 KG/M2 | HEIGHT: 58 IN | HEART RATE: 70 BPM | WEIGHT: 147 LBS | DIASTOLIC BLOOD PRESSURE: 70 MMHG

## 2023-11-13 DIAGNOSIS — Z13.820 SCREENING FOR OSTEOPOROSIS: ICD-10-CM

## 2023-11-13 DIAGNOSIS — M54.41 CHRONIC RIGHT-SIDED LOW BACK PAIN WITH RIGHT-SIDED SCIATICA: ICD-10-CM

## 2023-11-13 DIAGNOSIS — F41.9 ANXIETY: Primary | ICD-10-CM

## 2023-11-13 DIAGNOSIS — G89.29 CHRONIC RIGHT-SIDED LOW BACK PAIN WITH RIGHT-SIDED SCIATICA: ICD-10-CM

## 2023-11-13 DIAGNOSIS — Z87.891 PERSONAL HISTORY OF TOBACCO USE: ICD-10-CM

## 2023-11-13 PROCEDURE — G0296 VISIT TO DETERM LDCT ELIG: HCPCS | Performed by: FAMILY MEDICINE

## 2023-11-13 PROCEDURE — 99214 OFFICE O/P EST MOD 30 MIN: CPT | Performed by: FAMILY MEDICINE

## 2023-11-13 RX ORDER — VENLAFAXINE HYDROCHLORIDE 75 MG/1
75 CAPSULE, EXTENDED RELEASE ORAL DAILY
Qty: 90 CAPSULE | Refills: 1 | Status: SHIPPED | OUTPATIENT
Start: 2023-11-13 | End: 2024-05-06

## 2023-11-13 RX ORDER — VENLAFAXINE HYDROCHLORIDE 37.5 MG/1
37.5 CAPSULE, EXTENDED RELEASE ORAL DAILY
Qty: 90 CAPSULE | Refills: 1 | Status: CANCELLED | OUTPATIENT
Start: 2023-11-13

## 2023-11-13 ASSESSMENT — ANXIETY QUESTIONNAIRES
7. FEELING AFRAID AS IF SOMETHING AWFUL MIGHT HAPPEN: MORE THAN HALF THE DAYS
GAD7 TOTAL SCORE: 18
2. NOT BEING ABLE TO STOP OR CONTROL WORRYING: NEARLY EVERY DAY
3. WORRYING TOO MUCH ABOUT DIFFERENT THINGS: NEARLY EVERY DAY
IF YOU CHECKED OFF ANY PROBLEMS ON THIS QUESTIONNAIRE, HOW DIFFICULT HAVE THESE PROBLEMS MADE IT FOR YOU TO DO YOUR WORK, TAKE CARE OF THINGS AT HOME, OR GET ALONG WITH OTHER PEOPLE: VERY DIFFICULT
5. BEING SO RESTLESS THAT IT IS HARD TO SIT STILL: MORE THAN HALF THE DAYS
GAD7 TOTAL SCORE: 18
6. BECOMING EASILY ANNOYED OR IRRITABLE: NEARLY EVERY DAY
1. FEELING NERVOUS, ANXIOUS, OR ON EDGE: NEARLY EVERY DAY

## 2023-11-13 ASSESSMENT — PATIENT HEALTH QUESTIONNAIRE - PHQ9
SUM OF ALL RESPONSES TO PHQ QUESTIONS 1-9: 19
5. POOR APPETITE OR OVEREATING: MORE THAN HALF THE DAYS

## 2023-11-13 NOTE — PATIENT INSTRUCTIONS
Lung Cancer Screening   Frequently Asked Questions  If you are at high-risk for lung cancer, getting screened with low-dose computed tomography (LDCT) every year can help save your life. This handout offers answers to some of the most common questions about lung cancer screening. If you have other questions, please call [phone] ([phone]).     What is it?  Lung cancer screening uses special X-ray technology to create an image of your lung tissue. The exam is quick and easy and takes less than 10 seconds. We don t give you any medicine or use any needles. You can eat before and after the exam. You don t need to change your clothes as long as the clothing on your chest doesn t contain metal. But, you do need to be able to hold your breath for at least 6 seconds during the exam.    What is the goal of lung cancer screening?  The goal of lung cancer screening is to save lives. Many times, lung cancer is not found until a person starts having physical symptoms. Lung cancer screening can help detect lung cancer in the earliest stages when it may be easier to treat.    Who should be screened for lung cancer?  We suggest lung cancer screening for anyone who is at high-risk for lung cancer. You are in the high-risk group if you:      are between the ages of 55 and 79, and    have smoked at least 1 pack of cigarettes a day for 20 or more years, and    still smoke or have quit within the past 15 years.    However, if you have a new cough or shortness of breath, you should talk to your doctor before being screened.    Why does it matter if I have symptoms?  Certain symptoms can be a sign that you have a condition in your lungs that should be checked and treated by your doctor. These symptoms include fever, chest pain, a new or changing cough, shortness of breath that you have never felt before, coughing up blood or unexplained weight loss. Having any of these symptoms can greatly affect the results of lung  cancer screening.       Should all smokers get an LDCT lung cancer screening exam?  It depends. Lung cancer screening is for a very specific group of men and women who have a history of heavy smoking over a long period of time (see  Who should be screened for lung cancer  above).  I am in the high-risk group, but have been diagnosed with cancer in the past. Is LDCT lung cancer screening right for me?  In some cases, you should not have LDCT lung screening, such as when your doctor is already following your cancer with CT scan studies. Your doctor will help you decide if LDCT lung screening is right for you.  Do I need to have a screening exam every year?  Yes. If you are in the high-risk group described earlier, you should get an LDCT lung cancer screening exam every year until you are 79, or are no longer willing or able to undergo screening and possible procedures to diagnose and treat lung cancer.  How effective is LDCT at preventing death from lung cancer?  Studies have shown that LDCT lung cancer screening can lower the risk of death from lung cancer by 20 percent in people who are at high-risk.  What are the risks?  There are some risks and limitations of LDCT lung cancer screening. We want to make sure you understand the risks and benefits, so please let us know if you have any questions. Your doctor may want to talk with you more about these risks.    Radiation exposure: As with any exam that uses radiation, there is a very small increased risk of cancer. The amount of radiation in LDCT is small--about the same amount a person would get from a mammogram. Your doctor orders the exam when he or she feels the potential benefits outweigh the risks.    False negatives: No test is perfect, including LDCT. It is possible that you may have a medical condition, including lung cancer, that is not found during your exam. This is called a false negative result.    False positives and more testing: LDCT very often finds  something in the lung that could be cancer, but in fact is not. This is called a false positive result. False positive tests often cause anxiety. To make sure these findings are not cancer, you may need to have more tests. These tests will be done only if you give us permission. Sometimes patients need a treatment that can have side effects, such as a biopsy. For more information on false positives, see  What can I expect from the results?     Findings not related to lung cancer: Your LDCT exam also takes pictures of areas of your body next to your lungs. In a very small number of cases, the CT scan will show an abnormal finding in one of these areas, such as your kidneys, adrenal glands, liver or thyroid. This finding may not be serious, but you may need more tests. Your doctor can help you decide what other tests you may need, if any.  What can I expect from the results?  About 1 out of 4 LDCT exams will find something that may need more tests. Most of the time, these findings are lung nodules. Lung nodules are very small collections of tissue in the lung. These nodules are very common, and the vast majority--more than 97 percent--are not cancer (benign). Most are normal lymph nodes or small areas of scarring from past infections.  But, if a small lung nodule is found to be cancer, the cancer can be cured more than 90 percent of the time. To know if the nodule is cancer, we may need to get more images before your next yearly screening exam. If the nodule has suspicious features (for example, it is large, has an odd shape or grows over time), we will refer you to a specialist for further testing.  Will my doctor also get the results?  Yes. Your doctor will get a copy of your results.  Is it okay to keep smoking now that there s a cancer screening exam?  No. Tobacco is one of the strongest cancer-causing agents. It causes not only lung cancer, but other cancers and cardiovascular (heart) diseases as well. The damage  caused by smoking builds over time. This means that the longer you smoke, the higher your risk of disease. While it is never too late to quit, the sooner you quit, the better.  Where can I find help to quit smoking?  The best way to prevent lung cancer is to stop smoking. If you have already quit smoking, congratulations and keep it up! For help on quitting smoking, please call [employer] at 7-287-QUITNOW (1-456.820.5874) or the American [employer] Society at 1-641.136.9172 to find local resources near you.  One-on-one health coaching:  If you d prefer to work individually with a health care provider on tobacco cessation, we offer:      Medication Therapy Management:  Our specially trained pharmacists work closely with you and your doctor to help you quit smoking.  Call [phone] or [phone] (toll free).

## 2023-11-13 NOTE — NURSING NOTE
Chief Complaint   Patient presents with    Recheck Medication     Anxiety med recheck    Referral     Referral for MRI- left hip and back pain then will go to [facility]         Pre-visit Screening:  Immunizations:  up to date  Colonoscopy:  UTD  Mammogram: UTD  Asthma Action Test/Plan:  NA  PHQ9:  Done today  GAD7:  Done today  Questioned patient about current smoking habits Pt. Smokes 20 cigs per day  Ok to leave detailed message on voice mail for today's visit only Yes, phone # [phone]

## 2023-11-21 ENCOUNTER — TRANSFERRED RECORDS (OUTPATIENT)
Dept: FAMILY MEDICINE | Facility: CLINIC | Age: 68
End: 2023-11-21

## 2023-12-13 ENCOUNTER — OFFICE VISIT (OUTPATIENT)
Dept: FAMILY MEDICINE | Facility: CLINIC | Age: 68
End: 2023-12-13

## 2023-12-13 VITALS
SYSTOLIC BLOOD PRESSURE: 108 MMHG | DIASTOLIC BLOOD PRESSURE: 68 MMHG | BODY MASS INDEX: 30.86 KG/M2 | HEART RATE: 77 BPM | TEMPERATURE: 98.1 F | WEIGHT: 147 LBS | OXYGEN SATURATION: 97 % | HEIGHT: 58 IN

## 2023-12-13 DIAGNOSIS — I25.119 ATHEROSCLEROSIS OF NATIVE CORONARY ARTERY OF NATIVE HEART WITH ANGINA PECTORIS (H): ICD-10-CM

## 2023-12-13 DIAGNOSIS — G89.29 CHRONIC BACK PAIN, UNSPECIFIED BACK LOCATION, UNSPECIFIED BACK PAIN LATERALITY: ICD-10-CM

## 2023-12-13 DIAGNOSIS — Z01.818 PREOPERATIVE EXAMINATION: Primary | ICD-10-CM

## 2023-12-13 DIAGNOSIS — M54.9 CHRONIC BACK PAIN, UNSPECIFIED BACK LOCATION, UNSPECIFIED BACK PAIN LATERALITY: ICD-10-CM

## 2023-12-13 DIAGNOSIS — F32.89 OTHER DEPRESSION: ICD-10-CM

## 2023-12-13 DIAGNOSIS — Z72.0 TOBACCO ABUSE: ICD-10-CM

## 2023-12-13 DIAGNOSIS — I10 ESSENTIAL HYPERTENSION: ICD-10-CM

## 2023-12-13 DIAGNOSIS — I65.23 CAROTID STENOSIS, BILATERAL: ICD-10-CM

## 2023-12-13 DIAGNOSIS — E78.00 PURE HYPERCHOLESTEROLEMIA: ICD-10-CM

## 2023-12-13 DIAGNOSIS — R94.39 ABNORMAL CARDIOVASCULAR STRESS TEST: ICD-10-CM

## 2023-12-13 DIAGNOSIS — R31.29 MICROSCOPIC HEMATURIA: ICD-10-CM

## 2023-12-13 LAB
% GRANULOCYTES: 70.2 %
APPEARANCE UR: ABNORMAL
BACTERIA, UR: ABNORMAL
BILIRUB UR QL: ABNORMAL
BUN SERPL-MCNC: 18 MG/DL (ref 7–25)
BUN/CREATININE RATIO: 17.8 (ref 6–32)
CALCIUM SERPL-MCNC: 9.7 MG/DL (ref 8.6–10.3)
CASTS/LPF: ABNORMAL
CHLORIDE SERPLBLD-SCNC: 99.3 MMOL/L (ref 98–110)
CO2 SERPL-SCNC: 31.3 MMOL/L (ref 20–32)
COLOR UR: YELLOW
CREAT SERPL-MCNC: 1.01 MG/DL (ref 0.6–1.3)
EP/HPF: ABNORMAL
GLUCOSE SERPL-MCNC: 129 MG/DL (ref 60–99)
GLUCOSE URINE: ABNORMAL MG/DL
HCT VFR BLD AUTO: 44.3 % (ref 35–47)
HEMOGLOBIN: 14.2 G/DL (ref 11.7–15.7)
HGB UR QL: ABNORMAL
KETONES UR QL: ABNORMAL MG/DL
LYMPHOCYTES NFR BLD AUTO: 21.8 %
MCH RBC QN AUTO: 31.3 PG (ref 26–33)
MCHC RBC AUTO-ENTMCNC: 32.1 G/DL (ref 31–36)
MCV RBC AUTO: 97.8 FL (ref 78–100)
MISC.: ABNORMAL
MONOCYTES NFR BLD AUTO: 8 %
NITRITE UR QL STRIP: ABNORMAL
PH UR STRIP: 6.5 PH (ref 5–7)
PLATELET COUNT - QUEST: 342 10^9/L (ref 150–375)
POTASSIUM SERPL-SCNC: 4.68 MMOL/L (ref 3.5–5.3)
PROT UR QL: ABNORMAL MG/DL
RBC # BLD AUTO: 4.53 10*12/L (ref 3.8–5.2)
RBC, UR MICRO: ABNORMAL (ref ?–2)
SODIUM SERPL-SCNC: 135.4 MMOL/L (ref 135–146)
SP GR UR STRIP: 1.02
UROBILINOGEN UR QL STRIP: 1 EU/DL (ref 0.2–1)
WBC # BLD AUTO: 8.6 10*9/L (ref 4–11)
WBC #/AREA URNS HPF: ABNORMAL /[HPF]
WBC, UR MICRO: ABNORMAL (ref ?–2)

## 2023-12-13 PROCEDURE — 36415 COLL VENOUS BLD VENIPUNCTURE: CPT | Performed by: FAMILY MEDICINE

## 2023-12-13 PROCEDURE — 99214 OFFICE O/P EST MOD 30 MIN: CPT | Performed by: FAMILY MEDICINE

## 2023-12-13 PROCEDURE — 80048 BASIC METABOLIC PNL TOTAL CA: CPT | Performed by: FAMILY MEDICINE

## 2023-12-13 PROCEDURE — 85025 COMPLETE CBC W/AUTO DIFF WBC: CPT | Performed by: FAMILY MEDICINE

## 2023-12-13 PROCEDURE — 81001 URINALYSIS AUTO W/SCOPE: CPT | Performed by: FAMILY MEDICINE

## 2023-12-13 NOTE — PROGRESS NOTES
[facility] PHYSICIANS  [address] [place] [address]  [address]  [facility] [address]  Phone: [phone]  Fax: [phone]  Primary Provider: Dora Marie  Pre-op Performing Provider: DORA MARIE      PREOPERATIVE EVALUATION:  Today's date: 2023    Eliza is a 68 year old( [birth year]),  presenting for the following:  Pre-Op Exam (Surgery/Procedure: Right L3-4 decompression/Surgery Location: [facility] /Surgeon: Dr. Blanchard/Surgery Date: 23)      Surgical Information:  Surgery/Procedure: Right L3-4 decompression  Surgery Location: [facility]   Surgeon: Dr. Blanchard  Surgery Date: 23  Time of Surgery: AM  Where patient plans to recover: At home with family  Fax number for surgical facility:     Assessment & Plan     The proposed surgical procedure is considered INTERMEDIATE risk.    Problem List Items Addressed This Visit          Endocrine    Pure hypercholesterolemia       Circulatory    Essential hypertension    Relevant Orders    Basic Metabolic Panel (BFP)    Coronary atherosclerosis--followed by cardiology    Carotid stenosis, bilateral    Abnormal cardiovascular stress test       Behavioral    Tobacco abuse    Depression     Other Visit Diagnoses       Preoperative examination    -  Primary    Relevant Orders    VENOUS COLLECTION (Completed)    HEMOGRAM PLATELET DIFF (BFP) (Completed)    URINALYSIS, ROUTINE (BFP) (Completed)    Basic Metabolic Panel (BFP)    URINE CULTURE AEROBIC BACTERIAL ([facility])    Chronic back pain, unspecified back location, unspecified back pain laterality              1. Preoperative examination    - VENOUS COLLECTION  - HEMOGRAM PLATELET DIFF (BFP)  - URINALYSIS, ROUTINE (BFP)  - Basic Metabolic Panel (BFP)  - URINE CULTURE AEROBIC BACTERIAL ([facility])    2. Chronic back pain, unspecified back location, unspecified back pain laterality      3. Pure hypercholesterolemia      4. Abnormal cardiovascular stress test      5. Carotid stenosis,  "bilateral      6. Atherosclerosis of native coronary artery of native heart with angina pectoris (H24)      7. Essential hypertension    - Basic Metabolic Panel (BFP)    8. Other depression      9. Tobacco abuse       10. Microscopic hematuria  Urine culture negative, referral to urology with a history of tobacco use.  - Adult Urology  Referral      - No identified additional risk factors other than previously addressed    Antiplatelet or Anticoagulation Medication Instructions:  Patient is on aspirin and plavix. Instructed to call her cardiologist to ask if ok to hold these prior to the surgery.    Additional Medication Instructions:  Patient is to take all scheduled medications on the day of surgery EXCEPT for modifications listed below:  Patient is instructed to call her cardiologist to ask about if ok to stop aspirin and plavix.    RECOMMENDATION:  APPROVAL GIVEN to proceed with proposed procedure, without further diagnostic evaluation.    Subjective       HPI related to upcoming procedure: here with sister for preop, continuous low back and hip pain with shooting things down her right leg.this has been going on for 2 years and getting worse. Has done \"everything I was supposed to\"    1. No - Have you ever had a heart attack or stroke?  2. Yes - Have you ever had surgery on your heart or blood vessels, such as a stent, coronary (heart) bypass, or surgery on an artery in the head, neck, heart, or legs? 4 heart stents and surgery on carotid arteries in 2016  3. No - Do you have chest pain when you are physically active?  4. No - Do you have a history of heart failure?  5. No - Do you currently have a cold, bronchitis, or symptoms of other respiratory (head and chest) infections?  6. No - Do you have a cough, shortness of breath, or wheezing?  7. No - Do you or anyone in your family have a history of blood clots?  8. No - Do you or anyone in your family have a serious bleeding problem, such as long-lasting " bleeding after surgeries or cuts?  9. No - Have you ever had anemia or been told to take iron pills?  10. No - Have you had any abnormal blood loss such as black, tarry or bloody stools, or abnormal vaginal bleeding?  11. No - Have you ever had a blood transfusion?  12. Yes - Are you willing to have a blood transfusion if it is medically needed before, during, or after your surgery?  13. No - Have you or anyone in your family ever had problems with anesthesia (sedation for surgery)?  14. No - Do you have sleep apnea, excessive snoring, or daytime drowsiness?   15. No - Do you have any artifical heart valves or other implanted medical devices, such as a pacemaker, defibrillator, or continuous glucose monitor?  16. No - Do you have any artifical joints?  17. No - Are you allergic to latex?  18. No - Is there any chance that you may be pregnant?    Health Care Directive:  Patient does not have a Health Care Directive or Living Will: Discussed advance care planning with patient; information given to patient to review.    Preoperative Review of :   reviewed - few vidodin prescribed in September after a thumb surgery, not currently taking this.      Status of Chronic Conditions:  HYPERLIPIDEMIA - Patient has a long history of significant Hyperlipidemia requiring medication for treatment with recent good control. Patient reports no problems or side effects with the medication.     HYPERTENSION - Patient has longstanding history of HTN , currently denies any symptoms referable to elevated blood pressure. Specifically denies chest pain, palpitations, dyspnea, orthopnea, PND or peripheral edema. Blood pressure readings have been in normal range. Current medication regimen is as listed below. Patient denies any side effects of medication.     Review of Systems  CONSTITUTIONAL: NEGATIVE for fever, chills, change in weight  INTEGUMENTARY/SKIN: NEGATIVE for worrisome rashes, moles or lesions  EYES: NEGATIVE for vision  changes or irritation  ENT/MOUTH: NEGATIVE for ear, mouth and throat problems  RESP: NEGATIVE for significant cough or SOB  CV: NEGATIVE for chest pain, palpitations or peripheral edema  GI: NEGATIVE for nausea, abdominal pain, heartburn, or change in bowel habits  : NEGATIVE for frequency, dysuria, or hematuria  MUSCULOSKELETAL: NEGATIVE for significant arthralgias or myalgia  NEURO: NEGATIVE for weakness, dizziness or paresthesias  ENDOCRINE: NEGATIVE for temperature intolerance, skin/hair changes  HEME: NEGATIVE for bleeding problems  PSYCHIATRIC: NEGATIVE for changes in mood or affect    Patient Active Problem List    Diagnosis Date Noted    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     Priority: Medium    Palpitations 05/10/2021     Priority: Medium    SAEED (dyspnea on exertion) 05/10/2021     Priority: Medium    Coronary atherosclerosis--followed by cardiology 05/10/2021     Priority: Medium     S/P stenting of Circ  May 21st, 2019      Abnormal cardiovascular stress test 05/10/2021     Priority: Medium    ACP (advance care planning) 05/10/2021     Priority: Medium    Health Care Home 05/10/2021     Priority: Medium    Anxiety 05/10/2021     Priority: Medium    Research study patient 05/21/2019     Priority: Medium     A post-approval study of the Medtronic Resolute TALISHA Zotoroliums-Eluting Coronary Stent System  To assess the continued safety and efficacy of the Resolute Lyman stent for the treatment of lesions in the coronary arteries amenable to treatment with a Resolute Talisha 2.0mm-5.0mm. If hospitalized for cardiac reasons please contact     -  Daniela Copeland phone: [phone]      Essential hypertension 09/08/2016     Priority: Medium    Tobacco abuse 09/07/2016     Priority: Medium    Pure hypercholesterolemia 09/07/2016     Priority: Medium    Migraine headache 09/07/2016     Priority: Medium    Depression 09/07/2016     Priority: Medium    Carotid stenosis, bilateral 09/07/2016     " Priority: Medium      No past medical history on file.  Past Surgical History:   Procedure Laterality Date    CAROTID ENDARTERECTOMY Bilateral      SECTION      FOOT SURGERY Left     HEART CATH STENT COR W/WO PTCA  2019    SHOULDER SURGERY Right     WRIST SURGERY Left      Current Outpatient Medications   Medication Sig Dispense Refill    aspirin 81 MG EC tablet Take 81 mg by mouth daily      atorvastatin (LIPITOR) 80 MG tablet Take 80 mg by mouth Filled by Cardiology      clopidogrel (PLAVIX) 75 MG tablet Take 75 mg by mouth Filled by Cardiology      ezetimibe (ZETIA) 10 MG tablet Filled by Cardiology      metoprolol succinate ER (TOPROL-XL) 25 MG 24 hr tablet Take 25 mg by mouth Filled by Cardiology      nitroGLYcerin (NITROSTAT) 0.4 MG sublingual tablet Place 1 tablet (0.4 mg) under the tongue every 5 minutes as needed for chest pain For chest pain place 1 tablet under the tongue every 5 minutes for 3 doses. If symptoms persist 5 minutes after 1st dose call 911. 25 tablet 1    venlafaxine (EFFEXOR XR) 75 MG 24 hr capsule Take 1 capsule (75 mg) by mouth daily 90 capsule 1       Allergies   Allergen Reactions    Penicillins Hives    Sulfa Antibiotics Hives    Tobramycin Rash        Social History     Tobacco Use    Smoking status: Every Day     Passive exposure: Current    Smokeless tobacco: Never   Substance Use Topics    Alcohol use: Never     Family History   Problem Relation Age of Onset    Heart Disease Mother     Depression Mother     Hypertension Mother     Alcoholism Father     Heart Disease Father     Skin Cancer Sister     Heart Disease Brother     Coronary Artery Disease Brother     Cerebrovascular Disease Brother      History   Drug Use Unknown         Objective     /68 (BP Location: Right arm, Patient Position: Sitting, Cuff Size: Adult Large)   Pulse 77   Temp 98.1  F (36.7  C) (Temporal)   Ht 1.473 m (4' 10\")   Wt 66.7 kg (147 lb)   SpO2 97%   BMI 30.72 kg/m      Physical Exam    " "GENERAL APPEARANCE: healthy, alert and no distress     EYES: EOMI, PERRL     HENT: ear canals and TM's normal and nose and mouth without ulcers or lesions     NECK: no adenopathy, no asymmetry, masses, or scars and thyroid normal to palpation     RESP: lungs clear to auscultation - no rales, rhonchi or wheezes     CV: regular rates and rhythm, normal S1 S2, no S3 or S4 and no murmur, click or rub     ABDOMEN:  soft, nontender, no HSM or masses and bowel sounds normal     MS: extremities normal- no gross deformities noted, no evidence of inflammation in joints, FROM in all extremities.     SKIN: no suspicious lesions or rashes     NEURO: Normal strength and tone, sensory exam grossly normal, mentation intact and speech normal     PSYCH: mentation appears normal. and affect normal/bright     LYMPHATICS: No cervical adenopathy    No results for input(s): \"HGB\", \"PLT\", \"INR\", \"NA\", \"POTASSIUM\", \"CR\", \"A1C\" in the last 10621 hours.     Diagnostics:  Recent Results (from the past 240 hour(s))   Basic Metabolic Panel (BFP)    Collection Time: 12/13/23 12:00 AM   Result Value Ref Range    Carbon Dioxide 31.3 20 - 32 mmol/L    Creatinine 1.01 0.60 - 1.30 mg/dL    Glucose 129 (A) 60 - 99 mg/dL    Sodium 135.4 135 - 146 mmol/L    Potassium 4.68 3.5 - 5.3 mmol/L    Chloride 99.3 98 - 110 mmol/L    Urea Nitrogen 18 7 - 25 mg/dL    Calcium 9.7 8.6 - 10.3 mg/dL    BUN/Creatinine Ratio 17.8 6 - 32   URINALYSIS, ROUTINE (BFP)    Collection Time: 12/13/23 10:36 AM   Result Value Ref Range    Color Urine Yellow     Appearance Urine Slightly Cloudy (A)     Glucose Urine Neg mg/dL    Bilirubin Urine Neg     Ketones Urine Neg mg/dL    Specific Gravity Urine 1.020     Blood Urine Small (A)     pH Urine 6.5 pH    Protein Urine neg neg - neg mg/dL    Urobilinogen Urine 1 EU/dL    Nitrite Urine Neg     Leukocytes neg     Wbc, Urine Micro 2-4 (A) neg - 2    RBC Micro Urine 1-3 (A) neg - 2    EP/HPF SMALL     Bacteria Urine MOD (A) neg - neg "    Casts/LPF neg     Miscellaneous neg    HEMOGRAM PLATELET DIFF (BFP)    Collection Time: 12/13/23 10:48 AM   Result Value Ref Range    WBC 8.6 4.0 - 11 10*9/L    RBC Count 4.53 3.8 - 5.2 10*12/L    Hemoglobin 14.2 11.7 - 15.7 g/dL    Hematocrit 44.3 35.0 - 47.0 %    MCV 97.8 78 - 100 fL    MCH 31.3 26 - 33 pg    MCHC 32.1 31 - 36 g/dL    Platelet Count 342 150 - 375 10^9/L    % Granulocytes 70.2 %    % Lymphocytes 21.8 %    % Monocytes 8.0 %   URINE CULTURE AEROBIC BACTERIAL ([facility])    Collection Time: 12/13/23 11:26 AM   Result Value Ref Range    Urine Voided Culture SEE NOTE       EKG: NSR in September 2023    Revised Cardiac Risk Index (RCRI):  The patient has the following serious cardiovascular risks for perioperative complications:   - Coronary Artery Disease (MI, positive stress test, angina, Qs on EKG) = 1 point     RCRI Interpretation: 1 point: Class II (low risk - 0.9% complication rate)         Signed Electronically by: Dora Marie MD  Copy of this evaluation report is provided to requesting physician.

## 2023-12-13 NOTE — NURSING NOTE
Chief Complaint   Patient presents with    Pre-Op Exam     Surgery/Procedure: Right L3-4 decompression  Surgery Location: [facility]   Surgeon: Dr. Blanchard  Surgery Date: 12/20/23

## 2023-12-15 LAB — URINE VOIDED CULTURE: NORMAL

## 2024-01-03 ENCOUNTER — TRANSFERRED RECORDS (OUTPATIENT)
Dept: FAMILY MEDICINE | Facility: CLINIC | Age: 69
End: 2024-01-03

## 2024-02-20 ENCOUNTER — TRANSFERRED RECORDS (OUTPATIENT)
Dept: FAMILY MEDICINE | Facility: CLINIC | Age: 69
End: 2024-02-20

## 2024-03-06 ENCOUNTER — OFFICE VISIT (OUTPATIENT)
Dept: CARDIOLOGY | Facility: CLINIC | Age: 69
End: 2024-03-06
Payer: COMMERCIAL

## 2024-03-06 VITALS
SYSTOLIC BLOOD PRESSURE: 128 MMHG | HEIGHT: 58 IN | BODY MASS INDEX: 29.62 KG/M2 | DIASTOLIC BLOOD PRESSURE: 68 MMHG | WEIGHT: 141.1 LBS | HEART RATE: 80 BPM

## 2024-03-06 DIAGNOSIS — I25.10 ATHEROSCLEROSIS OF NATIVE CORONARY ARTERY OF NATIVE HEART WITHOUT ANGINA PECTORIS: Primary | ICD-10-CM

## 2024-03-06 DIAGNOSIS — E78.2 MIXED HYPERLIPIDEMIA: ICD-10-CM

## 2024-03-06 DIAGNOSIS — I10 ESSENTIAL HYPERTENSION: ICD-10-CM

## 2024-03-06 DIAGNOSIS — E78.00 PURE HYPERCHOLESTEROLEMIA: ICD-10-CM

## 2024-03-06 DIAGNOSIS — I25.119 ATHEROSCLEROSIS OF NATIVE CORONARY ARTERY OF NATIVE HEART WITH ANGINA PECTORIS (H): ICD-10-CM

## 2024-03-06 DIAGNOSIS — R73.01 IMPAIRED FASTING GLUCOSE: ICD-10-CM

## 2024-03-06 DIAGNOSIS — I65.23 CAROTID STENOSIS, BILATERAL: ICD-10-CM

## 2024-03-06 DIAGNOSIS — Z72.0 TOBACCO ABUSE: ICD-10-CM

## 2024-03-06 PROCEDURE — 93000 ELECTROCARDIOGRAM COMPLETE: CPT | Performed by: INTERNAL MEDICINE

## 2024-03-06 PROCEDURE — 99205 OFFICE O/P NEW HI 60 MIN: CPT | Performed by: INTERNAL MEDICINE

## 2024-03-06 RX ORDER — CLOBETASOL PROPIONATE 0.5 MG/G
OINTMENT TOPICAL
COMMUNITY

## 2024-03-06 RX ORDER — CHOLECALCIFEROL (VITAMIN D3) 50 MCG
1 TABLET ORAL DAILY
COMMUNITY

## 2024-03-06 NOTE — LETTER
3/6/2024    Dora Marie MD  [address] [facility] [address]    RE: Eliza Saenz       Dear Colleague,     I had the pleasure of seeing Eliza Saenz in the [facility].  HPI and Plan:   Eliza is a very nice 68-year-old woman who is dissatisfied with her care through the [facility] system and seeking to find a new cardiologist.    She has a past medical history significant for ongoing tobacco abuse, impaired fasting glucose, mixed hyperlipidemia, migraines, hypertension, coronary artery disease, bilateral carotid endarterectomies, elevated LP(a) and a strong family history of coronary artery disease.  She has brothers who have had heart attacks and strokes in their 60s.    She had bilateral carotid endarterectomies in 2016 with last carotid ultrasound in 2021 demonstrating less than 50% bilateral stenoses.    She unfortunately continues to smoke 1 pack of cigarettes per day.  She asked about abdominal ultrasound screening and review of the record shows a CT scan through [facility] in 2015 not describing abdominal aortic aneurysm.    In 2019 she had coronary angiography demonstrating chronically occluded dominant right coronary artery with well-established collaterals and she underwent bifurcation stenting of her circumflex coronary artery and obtuse marginal with 4 stents placed.  Last echocardiogram was in August 2022 described an ejection fraction of 66% without focal wall motion abnormalities or any valvular problems.    She states she walks about a mile a day without any limitations or problems.    She does not follow any special diet.  She was unaware of the fact that she had impaired fasting glucose.    Review of records also shows an LP(a) of 216    She has had no stress testing over the years.    EKG today demonstrates normal sinus rhythm and a normal EKG    Assessment and plan.  Eliza has no symptoms at this time to suggest ischemia, heart failure or significant arrhythmia.    I  emphasized the most important thing is to quit smoking.  Told her to discuss with her primary care doctor medications that can help her quit if she is ready.    We talked about the benefits of a regular exercise regiment to include both aerobic and resistance activity.    We talked about the importance of a predominantly plant-based diet eliminating processed meat, fried foods, red meat and even cutting back on poultry.    We talked about her elevated LP(a) and the fact that there are ongoing studies with new drugs coming down the road soon    Fasting lipid profile was checked last fall through [facility] with total cholesterol 142, HDL of 48 LDL of 53 and triglycerides of 204.  She asks about bempedoic acid and some of the newer treatments for cholesterol.  I have told her cholesterol is fine on her combination of statin and Zetia and we will continue this as is.  She needs to focus on better lifestyle.    We reviewed her carotid studies and her CT of her abdomen.    We reviewed her medications at this time we can continue aspirin as she does not need to continue on dual antiplatelet therapy per the [place] meta-analysis and host exam trial.    I will have her follow-up with my HAILEY in 6 months.  I will see her back in a year.  If she should have any problems I be glad to see her sooner.    Cole Almonte MD [facility]      Today's clinic visit entailed:  Review of external notes as documented elsewhere in note  [facility] the result(s) of each [facility] lab work, coronary angiogram, carotid ultrasound, CT scan, echocardiogram, EKG  The following tests were independently interpreted by me as noted in my documentation: EKG  Ordering of each unique test  Prescription drug management  62 minutes spent by me on the date of the encounter doing chart review, history and exam, documentation and further activities per the note  Provider  Link to [facility] Help Grid     The level of medical decision making during  this visit was of moderate complexity.      Orders Placed This Encounter   Procedures    EKG 12-lead complete w/read - Clinics (performed today)       Orders Placed This Encounter   Medications    clobetasol (TEMOVATE) 0.05 % external ointment     Sig: Apply topically three times a week    Calcium Carbonate (CALCIUM 600 PO)     Sig: Take by mouth daily    vitamin D3 (CHOLECALCIFEROL) 50 mcg (2000 units) tablet     Sig: Take 1 tablet by mouth daily       Medications Discontinued During This Encounter   Medication Reason    aspirin 81 MG EC tablet          Encounter Diagnoses   Name Primary?    Essential hypertension     Atherosclerosis of native coronary artery of native heart with angina pectoris (H24)     Carotid stenosis, bilateral     Pure hypercholesterolemia     Tobacco abuse     Mixed hyperlipidemia     Impaired fasting glucose     Atherosclerosis of native coronary artery of native heart without angina pectoris Yes       CURRENT MEDICATIONS:  Current Outpatient Medications   Medication Sig Dispense Refill    atorvastatin (LIPITOR) 80 MG tablet Take 80 mg by mouth Filled by Cardiology      Calcium Carbonate (CALCIUM 600 PO) Take by mouth daily      clobetasol (TEMOVATE) 0.05 % external ointment Apply topically three times a week      clopidogrel (PLAVIX) 75 MG tablet Take 75 mg by mouth Filled by Cardiology      ezetimibe (ZETIA) 10 MG tablet Filled by Cardiology      metoprolol succinate ER (TOPROL-XL) 25 MG 24 hr tablet Take 25 mg by mouth Filled by Cardiology      nitroGLYcerin (NITROSTAT) 0.4 MG sublingual tablet Place 1 tablet (0.4 mg) under the tongue every 5 minutes as needed for chest pain For chest pain place 1 tablet under the tongue every 5 minutes for 3 doses. If symptoms persist 5 minutes after 1st dose call 911. 25 tablet 1    venlafaxine (EFFEXOR XR) 75 MG 24 hr capsule Take 1 capsule (75 mg) by mouth daily 90 capsule 1    vitamin D3 (CHOLECALCIFEROL) 50 mcg (2000 units) tablet Take 1 tablet by  "mouth daily         ALLERGIES     Allergies   Allergen Reactions    Penicillins Hives    Sulfa Antibiotics Hives    Tobramycin Rash       PAST MEDICAL HISTORY:  No past medical history on file.    PAST SURGICAL HISTORY:  Past Surgical History:   Procedure Laterality Date    CAROTID ENDARTERECTOMY Bilateral      SECTION      FOOT SURGERY Left     HEART CATH STENT COR W/WO PTCA  2019    SHOULDER SURGERY Right     WRIST SURGERY Left        FAMILY HISTORY:  Family History   Problem Relation Age of Onset    Heart Disease Mother     Depression Mother     Hypertension Mother     Alcoholism Father     Heart Disease Father     Skin Cancer Sister     Heart Disease Brother     Coronary Artery Disease Brother     Cerebrovascular Disease Brother        SOCIAL HISTORY:  Social History     Socioeconomic History    Marital status: Single     Spouse name: None    Number of children: None    Years of education: None    Highest education level: None   Tobacco Use    Smoking status: Every Day     Packs/day: 1     Types: Cigarettes     Start date:      Passive exposure: Current    Smokeless tobacco: Never   Substance and Sexual Activity    Alcohol use: Never    Drug use: Never       Review of Systems:  Skin:  Positive for   lichen sclerosis   Eyes:  Negative      ENT:  Positive for hearing loss;vertigo hearing aids  Respiratory:  Negative       Cardiovascular:    Positive for;palpitations;dizziness dizziness due to vertigo  Gastroenterology: Negative      Genitourinary:  Negative      Musculoskeletal:  Positive for arthritis hx bone spurs  Neurologic:  Negative      Psychiatric:  Negative      Heme/Lymph/Imm:  Positive for allergies RX  Endocrine:  Positive for hot flashes      Physical Exam:  Vitals: /68   Pulse 80   Ht 1.473 m (4' 10\")   Wt 64 kg (141 lb 1.6 oz)   BMI 29.49 kg/m      Constitutional:  cooperative, alert and oriented, well developed, well nourished, in no acute distress overweight      Skin:  warm " and dry to the touch, no apparent skin lesions or masses noted          Head:  normocephalic, no masses or lesions        Eyes:  pupils equal and round, conjunctivae and lids unremarkable, sclera white, no xanthalasma, EOMS intact, no nystagmus        Lymph:      ENT:  no pallor or cyanosis, dentition good        Neck:  no carotid bruit        Respiratory:  normal breath sounds, clear to auscultation, normal A-P diameter, normal symmetry, normal respiratory excursion, no use of accessory muscles         Cardiac: regular rhythm;no murmurs, gallops or rubs detected                pulses full and equal                                        GI:           Extremities and Muscular Skeletal:  no edema;no spinal abnormalities noted;normal muscle strength and tone              Neurological:  no gross motor deficits        Psych:  affect appropriate, oriented to time, person and place        CC  No referring provider defined for this encounter.      Thank you for allowing me to participate in the care of your patient.      Sincerely,     Cole Almonte MD     [facility]

## 2024-03-06 NOTE — PROGRESS NOTES
HPI and Plan:   Eliza is a very nice 68-year-old woman who is dissatisfied with her care through the [facility] system and seeking to find a new cardiologist.    She has a past medical history significant for ongoing tobacco abuse, impaired fasting glucose, mixed hyperlipidemia, migraines, hypertension, coronary artery disease, bilateral carotid endarterectomies, elevated LP(a) and a strong family history of coronary artery disease.  She has brothers who have had heart attacks and strokes in their 60s.    She had bilateral carotid endarterectomies in 2016 with last carotid ultrasound in 2021 demonstrating less than 50% bilateral stenoses.    She unfortunately continues to smoke 1 pack of cigarettes per day.  She asked about abdominal ultrasound screening and review of the record shows a CT scan through [facility] in 2015 not describing abdominal aortic aneurysm.    In 2019 she had coronary angiography demonstrating chronically occluded dominant right coronary artery with well-established collaterals and she underwent bifurcation stenting of her circumflex coronary artery and obtuse marginal with 4 stents placed.  Last echocardiogram was in August 2022 described an ejection fraction of 66% without focal wall motion abnormalities or any valvular problems.    She states she walks about a mile a day without any limitations or problems.    She does not follow any special diet.  She was unaware of the fact that she had impaired fasting glucose.    Review of records also shows an LP(a) of 216    She has had no stress testing over the years.    EKG today demonstrates normal sinus rhythm and a normal EKG    Assessment and plan.  Eliza has no symptoms at this time to suggest ischemia, heart failure or significant arrhythmia.    I emphasized the most important thing is to quit smoking.  Told her to discuss with her primary care doctor medications that can help her quit if she is ready.    We talked about the benefits of a regular  exercise regiment to include both aerobic and resistance activity.    We talked about the importance of a predominantly plant-based diet eliminating processed meat, fried foods, red meat and even cutting back on poultry.    We talked about her elevated LP(a) and the fact that there are ongoing studies with new drugs coming down the road soon    Fasting lipid profile was checked last fall through [facility] with total cholesterol 142, HDL of 48 LDL of 53 and triglycerides of 204.  She asks about bempedoic acid and some of the newer treatments for cholesterol.  I have told her cholesterol is fine on her combination of statin and Zetia and we will continue this as is.  She needs to focus on better lifestyle.    We reviewed her carotid studies and her CT of her abdomen.    We reviewed her medications at this time we can continue aspirin as she does not need to continue on dual antiplatelet therapy per the [place] meta-analysis and host exam trial.    I will have her follow-up with my HAILEY in 6 months.  I will see her back in a year.  If she should have any problems I be glad to see her sooner.    Cole Almonte MD [facility]      Today's clinic visit entailed:  Review of external notes as documented elsewhere in note  Review of the result(s) of each unique test - [facility] records, lab work, coronary angiogram, carotid ultrasound, CT scan, echocardiogram, EKG  The following tests were independently interpreted by me as noted in my documentation: EKG  Ordering of each unique test  Prescription drug management  62 minutes spent by me on the date of the encounter doing chart review, history and exam, documentation and further activities per the note  Provider  Link to [facility] Help Grid     The level of medical decision making during this visit was of moderate complexity.      Orders Placed This Encounter   Procedures    EKG 12-lead complete w/read - Clinics (performed today)       Orders Placed This Encounter   Medications     clobetasol (TEMOVATE) 0.05 % external ointment     Sig: Apply topically three times a week    Calcium Carbonate (CALCIUM 600 PO)     Sig: Take by mouth daily    vitamin D3 (CHOLECALCIFEROL) 50 mcg (2000 units) tablet     Sig: Take 1 tablet by mouth daily       Medications Discontinued During This Encounter   Medication Reason    aspirin 81 MG EC tablet          Encounter Diagnoses   Name Primary?    Essential hypertension     Atherosclerosis of native coronary artery of native heart with angina pectoris (H24)     Carotid stenosis, bilateral     Pure hypercholesterolemia     Tobacco abuse     Mixed hyperlipidemia     Impaired fasting glucose     Atherosclerosis of native coronary artery of native heart without angina pectoris Yes       CURRENT MEDICATIONS:  Current Outpatient Medications   Medication Sig Dispense Refill    atorvastatin (LIPITOR) 80 MG tablet Take 80 mg by mouth Filled by Cardiology      Calcium Carbonate (CALCIUM 600 PO) Take by mouth daily      clobetasol (TEMOVATE) 0.05 % external ointment Apply topically three times a week      clopidogrel (PLAVIX) 75 MG tablet Take 75 mg by mouth Filled by Cardiology      ezetimibe (ZETIA) 10 MG tablet Filled by Cardiology      metoprolol succinate ER (TOPROL-XL) 25 MG 24 hr tablet Take 25 mg by mouth Filled by Cardiology      nitroGLYcerin (NITROSTAT) 0.4 MG sublingual tablet Place 1 tablet (0.4 mg) under the tongue every 5 minutes as needed for chest pain For chest pain place 1 tablet under the tongue every 5 minutes for 3 doses. If symptoms persist 5 minutes after 1st dose call 911. 25 tablet 1    venlafaxine (EFFEXOR XR) 75 MG 24 hr capsule Take 1 capsule (75 mg) by mouth daily 90 capsule 1    vitamin D3 (CHOLECALCIFEROL) 50 mcg (2000 units) tablet Take 1 tablet by mouth daily         ALLERGIES     Allergies   Allergen Reactions    Penicillins Hives    Sulfa Antibiotics Hives    Tobramycin Rash       PAST MEDICAL HISTORY:  No past medical history on  "file.    PAST SURGICAL HISTORY:  Past Surgical History:   Procedure Laterality Date    CAROTID ENDARTERECTOMY Bilateral      SECTION      FOOT SURGERY Left     HEART CATH STENT COR W/WO PTCA  2019    SHOULDER SURGERY Right     WRIST SURGERY Left        FAMILY HISTORY:  Family History   Problem Relation Age of Onset    Heart Disease Mother     Depression Mother     Hypertension Mother     Alcoholism Father     Heart Disease Father     Skin Cancer Sister     Heart Disease Brother     Coronary Artery Disease Brother     Cerebrovascular Disease Brother        SOCIAL HISTORY:  Social History     Socioeconomic History    Marital status: Single     Spouse name: None    Number of children: None    Years of education: None    Highest education level: None   Tobacco Use    Smoking status: Every Day     Packs/day: 1     Types: Cigarettes     Start date:      Passive exposure: Current    Smokeless tobacco: Never   Substance and Sexual Activity    Alcohol use: Never    Drug use: Never       Review of Systems:  Skin:  Positive for   lichen sclerosis   Eyes:  Negative      ENT:  Positive for hearing loss;vertigo hearing aids  Respiratory:  Negative       Cardiovascular:    Positive for;palpitations;dizziness dizziness due to vertigo  Gastroenterology: Negative      Genitourinary:  Negative      Musculoskeletal:  Positive for arthritis hx bone spurs  Neurologic:  Negative      Psychiatric:  Negative      Heme/Lymph/Imm:  Positive for allergies RX  Endocrine:  Positive for hot flashes      Physical Exam:  Vitals: /68   Pulse 80   Ht 1.473 m (4' 10\")   Wt 64 kg (141 lb 1.6 oz)   BMI 29.49 kg/m      Constitutional:  cooperative, alert and oriented, well developed, well nourished, in no acute distress overweight      Skin:  warm and dry to the touch, no apparent skin lesions or masses noted          Head:  normocephalic, no masses or lesions        Eyes:  pupils equal and round, conjunctivae and lids unremarkable, " sclera white, no xanthalasma, EOMS intact, no nystagmus        Lymph:      ENT:  no pallor or cyanosis, dentition good        Neck:  no carotid bruit        Respiratory:  normal breath sounds, clear to auscultation, normal A-P diameter, normal symmetry, normal respiratory excursion, no use of accessory muscles         Cardiac: regular rhythm;no murmurs, gallops or rubs detected                pulses full and equal                                        GI:           Extremities and Muscular Skeletal:  no edema;no spinal abnormalities noted;normal muscle strength and tone              Neurological:  no gross motor deficits        Psych:  affect appropriate, oriented to time, person and place        CC  No referring provider defined for this encounter.

## 2024-05-06 ENCOUNTER — OFFICE VISIT (OUTPATIENT)
Dept: FAMILY MEDICINE | Facility: CLINIC | Age: 69
End: 2024-05-06

## 2024-05-06 VITALS
DIASTOLIC BLOOD PRESSURE: 76 MMHG | TEMPERATURE: 97.4 F | WEIGHT: 143 LBS | HEIGHT: 58 IN | SYSTOLIC BLOOD PRESSURE: 130 MMHG | HEART RATE: 65 BPM | BODY MASS INDEX: 30.02 KG/M2 | OXYGEN SATURATION: 96 %

## 2024-05-06 DIAGNOSIS — Z00.00 MEDICARE ANNUAL WELLNESS VISIT, SUBSEQUENT: Primary | ICD-10-CM

## 2024-05-06 DIAGNOSIS — F41.9 ANXIETY: ICD-10-CM

## 2024-05-06 DIAGNOSIS — E78.2 MIXED HYPERLIPIDEMIA: ICD-10-CM

## 2024-05-06 DIAGNOSIS — R73.01 IMPAIRED FASTING GLUCOSE: ICD-10-CM

## 2024-05-06 LAB — HEMOGLOBIN A1C: 6.2 % (ref 4–5.6)

## 2024-05-06 PROCEDURE — 83036 HEMOGLOBIN GLYCOSYLATED A1C: CPT | Performed by: FAMILY MEDICINE

## 2024-05-06 PROCEDURE — G0439 PPPS, SUBSEQ VISIT: HCPCS | Performed by: FAMILY MEDICINE

## 2024-05-06 PROCEDURE — 36415 COLL VENOUS BLD VENIPUNCTURE: CPT | Performed by: FAMILY MEDICINE

## 2024-05-06 PROCEDURE — 99214 OFFICE O/P EST MOD 30 MIN: CPT | Performed by: FAMILY MEDICINE

## 2024-05-06 RX ORDER — VENLAFAXINE HYDROCHLORIDE 75 MG/1
75 CAPSULE, EXTENDED RELEASE ORAL DAILY
Qty: 90 CAPSULE | Refills: 1 | Status: SHIPPED | OUTPATIENT
Start: 2024-05-06 | End: 2024-08-23

## 2024-05-06 RX ORDER — NEOMYCIN SULFATE, POLYMYXIN B SULFATE AND BACITRACIN ZINC 3.5; 10000; 4 MG/G; [USP'U]/G; [USP'U]/G
OINTMENT OPHTHALMIC
COMMUNITY
End: 2024-08-19

## 2024-05-06 ASSESSMENT — ANXIETY QUESTIONNAIRES
GAD7 TOTAL SCORE: 16
IF YOU CHECKED OFF ANY PROBLEMS ON THIS QUESTIONNAIRE, HOW DIFFICULT HAVE THESE PROBLEMS MADE IT FOR YOU TO DO YOUR WORK, TAKE CARE OF THINGS AT HOME, OR GET ALONG WITH OTHER PEOPLE: SOMEWHAT DIFFICULT
6. BECOMING EASILY ANNOYED OR IRRITABLE: MORE THAN HALF THE DAYS
5. BEING SO RESTLESS THAT IT IS HARD TO SIT STILL: SEVERAL DAYS
GAD7 TOTAL SCORE: 16
1. FEELING NERVOUS, ANXIOUS, OR ON EDGE: NEARLY EVERY DAY
2. NOT BEING ABLE TO STOP OR CONTROL WORRYING: NEARLY EVERY DAY
3. WORRYING TOO MUCH ABOUT DIFFERENT THINGS: NEARLY EVERY DAY
7. FEELING AFRAID AS IF SOMETHING AWFUL MIGHT HAPPEN: MORE THAN HALF THE DAYS

## 2024-05-06 ASSESSMENT — PATIENT HEALTH QUESTIONNAIRE - PHQ9: 5. POOR APPETITE OR OVEREATING: MORE THAN HALF THE DAYS

## 2024-05-06 NOTE — PATIENT INSTRUCTIONS
Health Maintenance   Topic Date Due    LIPID  10/05/2022    MAMMO SCREENING  07/12/2023    COVID-19 Vaccine (7 - 2023-24 season) 02/17/2024    MEDICARE ANNUAL WELLNESS VISIT  03/07/2024    DTAP/TDAP/TD IMMUNIZATION (2 - Td or Tdap) 12/01/2026 (Originally 3/20/2024)    INFLUENZA VACCINE (Season Ended) 09/01/2024    FALL RISK ASSESSMENT  09/18/2024    LUNG CANCER SCREENING  12/04/2024    NICOTINE/TOBACCO CESSATION COUNSELING Q 1 YR  05/06/2025    DEXA  12/04/2025    GLUCOSE  12/13/2026    ADVANCE CARE PLANNING  12/13/2028    HEPATITIS C SCREENING  Completed    PHQ-2 (once per calendar year)  Completed    Pneumococcal Vaccine: 65+ Years  Completed    ZOSTER IMMUNIZATION  Completed    RSV VACCINE (Pregnancy & 60+)  Completed    IPV IMMUNIZATION  Aged Out    HPV IMMUNIZATION  Aged Out    MENINGITIS IMMUNIZATION  Aged Out    RSV MONOCLONAL ANTIBODY  Aged Out    COLORECTAL CANCER SCREENING  Discontinued

## 2024-05-06 NOTE — PROGRESS NOTES
Eliza Saenz is a 68 year old female who presents for Medicare Annual Wellness Visit.    Current providers caring for this patient include:  Patient Care Team:  Dora Marie MD as PCP - General (Family Medicine)  Dora Marie MD as Assigned PCP  Cole Almonte MD as MD (Cardiovascular Disease)  Cole Almonte MD as Assigned Heart and Vascular Provider  Martita Chauhan APRN CNP as Nurse Practitioner (Cardiovascular Disease)    Complete Medical and Social history reviewed with patient, outlined below.    Patient Active Problem List   Diagnosis    Tobacco abuse    Research study patient    Mixed hyperlipidemia    Migraine headache    Palpitations    Essential hypertension    SAEED (dyspnea on exertion)    Depression    Atherosclerosis of native coronary artery of native heart without angina pectoris    Carotid stenosis, bilateral    Abnormal cardiovascular stress test    ACP (advance care planning)    Health Care Home    Anxiety    Atrophy of vagina    Impaired fasting glucose       No past medical history on file.    Past Surgical History:   Procedure Laterality Date    CAROTID ENDARTERECTOMY Bilateral      SECTION      FOOT SURGERY Left     HEART CATH STENT COR W/WO [facility]CA  2019    SHOULDER SURGERY Right     WRIST SURGERY Left        Family History   Problem Relation Age of Onset    Heart Disease Mother     Depression Mother     Hypertension Mother     Alcoholism Father     Heart Disease Father     Skin Cancer Sister     Heart Disease Brother     Coronary Artery Disease Brother     Cerebrovascular Disease Brother        Social History     Tobacco Use    Smoking status: Every Day     Current packs/day: 1.00     Average packs/day: 1 pack/day for 50.3 years (50.3 ttl pk-yrs)     Types: Cigarettes     Start date:      Passive exposure: Current    Smokeless tobacco: Never   Substance Use Topics    Alcohol use: Never       Diet: regular, low salt/low fat  Physical Activity: active  without specific exercise program  Depression Screen:    Over the past 2 weeks, patient has felt down, depressed, or hopeless:  Yes    Over the past 2 weeks, patient has felt little interest or pleasure in doing things: No    Functional ability/Safety screen:  Up and go test (able to get up and walk longer than 30 seconds): Passed  Patient needs assistance with: nothing  Patient's home has the following possible safety concerns: none identified  Patient has concerns about her hearing:  No-hearing aids  Cognitive Screen  Patient repeats three objects (ball, flag, tree)      Clock drawing test:   NORMAL  Recalls three objects after 3 minutes (ball,flag,tree):                                                                                               recalls 3 objects (3 points)    Physical Exam:  There were no vitals taken for this visit.   There is no height or weight on file to calculate BMI.           End of Life Planning:   Patient currently has an advanced directive: No.  I have verified the patient's ablity to prepare an advanced directive/make health care decisions.  Literature was provided to assist patient in preparing an advanced directive.    Education/Counseling:   Based on review of the above information, the following items were addressed:      Healthy diet and regular exercise    Appropriate preventive services were discussed with this patient, including applicable screening as appropriate for cardiovascular disease, diabetes, osteopenia/osteoporosis, and glaucoma.  As appropriate for age/gender, discussed screening for colorectal cancer, prostate cancer, breast cancer, and cervical cancer.   Checklist reviewing preventive services available has been given to the patient.        HM reviewed and is up to date

## 2024-06-17 PROBLEM — Z76.89 HEALTH CARE HOME: Status: RESOLVED | Noted: 2021-05-10 | Resolved: 2024-06-17

## 2024-08-19 ENCOUNTER — OFFICE VISIT (OUTPATIENT)
Dept: FAMILY MEDICINE | Facility: CLINIC | Age: 69
End: 2024-08-19

## 2024-08-19 VITALS
TEMPERATURE: 98.1 F | OXYGEN SATURATION: 96 % | DIASTOLIC BLOOD PRESSURE: 76 MMHG | HEART RATE: 77 BPM | WEIGHT: 143 LBS | SYSTOLIC BLOOD PRESSURE: 126 MMHG | BODY MASS INDEX: 29.89 KG/M2

## 2024-08-19 DIAGNOSIS — N95.0 POSTMENOPAUSAL VAGINAL BLEEDING: ICD-10-CM

## 2024-08-19 DIAGNOSIS — N30.01 ACUTE CYSTITIS WITH HEMATURIA: ICD-10-CM

## 2024-08-19 DIAGNOSIS — Z86.2 HISTORY OF ANEMIA: ICD-10-CM

## 2024-08-19 DIAGNOSIS — E07.9 THYROID DISEASE: ICD-10-CM

## 2024-08-19 DIAGNOSIS — R31.9 HEMATURIA, UNSPECIFIED TYPE: ICD-10-CM

## 2024-08-19 DIAGNOSIS — N95.2 ATROPHY OF VAGINA: ICD-10-CM

## 2024-08-19 DIAGNOSIS — Z72.0 TOBACCO ABUSE: ICD-10-CM

## 2024-08-19 DIAGNOSIS — R39.9 URINARY SYMPTOM OR SIGN: Primary | ICD-10-CM

## 2024-08-19 LAB
% GRANULOCYTES: 70.4 %
APPEARANCE UR: CLEAR
BACTERIA, UR: ABNORMAL
BILIRUB UR QL: ABNORMAL
CASTS/LPF: ABNORMAL
COLOR UR: YELLOW
EP/HPF: ABNORMAL
GLUCOSE URINE: ABNORMAL MG/DL
HCT VFR BLD AUTO: 42.6 % (ref 35–47)
HEMOGLOBIN: 13.9 G/DL (ref 11.7–15.7)
HGB UR QL: ABNORMAL
KETONES UR QL: ABNORMAL MG/DL
LYMPHOCYTES NFR BLD AUTO: 21.6 %
MCH RBC QN AUTO: 31.8 PG (ref 26–33)
MCHC RBC AUTO-ENTMCNC: 32.6 G/DL (ref 31–36)
MCV RBC AUTO: 97.4 FL (ref 78–100)
MISC.: ABNORMAL
MONOCYTES NFR BLD AUTO: 8 %
NITRITE UR QL STRIP: ABNORMAL
PH UR STRIP: 7 PH (ref 5–7)
PLATELET COUNT - QUEST: 314 10^9/L (ref 150–375)
PROT UR QL: ABNORMAL MG/DL
RBC # BLD AUTO: 4.37 10*12/L (ref 3.8–5.2)
RBC, UR MICRO: ABNORMAL (ref ?–2)
SP GR UR STRIP: 1.01
UROBILINOGEN UR QL STRIP: 0.2 EU/DL (ref 0.2–1)
WBC # BLD AUTO: 8.9 10*9/L (ref 4–11)
WBC #/AREA URNS HPF: ABNORMAL /[HPF]
WBC, UR MICRO: ABNORMAL (ref ?–2)

## 2024-08-19 PROCEDURE — 81001 URINALYSIS AUTO W/SCOPE: CPT | Performed by: FAMILY MEDICINE

## 2024-08-19 PROCEDURE — 85025 COMPLETE CBC W/AUTO DIFF WBC: CPT | Performed by: FAMILY MEDICINE

## 2024-08-19 PROCEDURE — 99214 OFFICE O/P EST MOD 30 MIN: CPT | Performed by: FAMILY MEDICINE

## 2024-08-19 PROCEDURE — G2211 COMPLEX E/M VISIT ADD ON: HCPCS | Performed by: FAMILY MEDICINE

## 2024-08-19 PROCEDURE — 36415 COLL VENOUS BLD VENIPUNCTURE: CPT | Performed by: FAMILY MEDICINE

## 2024-08-19 RX ORDER — NITROFURANTOIN 25; 75 MG/1; MG/1
100 CAPSULE ORAL 2 TIMES DAILY
Qty: 14 CAPSULE | Refills: 0 | Status: SHIPPED | OUTPATIENT
Start: 2024-08-19 | End: 2024-08-26

## 2024-08-19 NOTE — PROGRESS NOTES
Assessment & Plan   Problem List Items Addressed This Visit       Tobacco abuse    Atrophy of vagina     Other Visit Diagnoses       Urinary symptom or sign    -  Primary    Relevant Orders    URINALYSIS, ROUTINE ([facility]) (Completed)    URINE CULTURE AEROBIC BACTERIAL ([facility]) (Completed)    Postmenopausal vaginal bleeding        Relevant Orders    Radiology Referral (Affiliate Use Only)    Ob/Gyn  Referral - To a [facility] Location (Use POS/Location)    Acute cystitis with hematuria        Relevant Medications    nitroFURantoin macrocrystal-monohydrate (MACROBID) 100 MG capsule    Hematuria, unspecified type        Thyroid disease        Relevant Orders    VENOUS COLLECTION (Completed)    TSH WITH FREE T4 REFLEX ([facility]) (Completed)    History of anemia        Relevant Orders    HEMOGRAM PLATELET DIFF ([facility]) (Completed)           1. Urinary symptom or sign    - URINALYSIS, ROUTINE ([facility])  - URINE CULTURE AEROBIC BACTERIAL ([facility])    2. Postmenopausal vaginal bleeding  Explained that postmenopausal bleeding is not normal. Referral done for pelvic ultrasound with followup with gynecology.  - Radiology Referral (Affiliate Use Only)  - Ob/Gyn  Referral - To a [facility] Location (Use POS/Location)    3. Acute cystitis with hematuria  Abnormal urine with symptoms suggest infection, treat with antibiotics.  - nitroFURantoin macrocrystal-monohydrate (MACROBID) 100 MG capsule; Take 1 capsule (100 mg) by mouth 2 times daily for 7 days  Dispense: 14 capsule; Refill: 0    4. Tobacco abuse  She still smokes.    5. Hematuria, unspecified type  Apparent hematuria in a smoker, this could be due to infection or this could be due to vaginal bleeding, although she doesn't report this now. Also has history vaginal atrophy. I will refer for additional evaluation to urology.  - Adult Urology  Referral - To a [facility] Location (Use POS/Location)    6. Atrophy of vagina  Followed by  "gynecology.    7. Thyroid disease  Check tsh.  - VENOUS COLLECTION  - TSH WITH FREE T4 REFLEX ([facility])    8. History of anemia  Cbc in range.  - HEMOGRAM PLATELET DIFF ([facility])            BMI  Estimated body mass index is 29.89 kg/m  as calculated from the following:    Height as of 5/6/24: 1.473 m (4' 10\").    Weight as of this encounter: 64.9 kg (143 lb).         FUTURE APPOINTMENTS:       - Follow-up visit when due for medication check. We manage her chronic medical care.    No follow-ups on file.    Dora Marie MD  [facility]    Subjective     Nursing Notes:   Jenn Tong, [facility]  8/19/2024 11:31 AM  Signed  Chief Complaint   Patient presents with    UTI     Discomfort after urinating, increased urinary frequency, history of lichens sclerosis     Consult     She has been advised she has an underactive thyroid and borderline anemia, she would like these rechecked     Vaginal Bleeding     Postmenopausal bleeding      Pre-visit Screening:  Immunizations:  not up to date - shingrix at pharmacy  Colonoscopy:  is up to date  Mammogram: is due and to be scheduled by patient for later completion  Asthma Action Test/Plan:  NA  PHQ9:  NA  GAD7:  NA  Questioned patient about current smoking habits Pt. smokes   Ok to leave detailed message on voice mail for today's visit only Yes, phone # [phone]       Eliza Saenz is a 69 year old female who presents to clinic today for the following health issues   HPI     Here for her urinary symptoms. When done with urination, when almost done, felt like something not right. No discomfort. Felt odd.     Had vaginal bleeding in June, not heavy, was there when wiped but was gone for 1 1/2 days. Then a couple weeks ago had blood again and this was clots. This was also vaginal.  History lichen sclerosis    Wants to check her thyroid, it's been borderline in the past and also anemia      Review of Systems   Constitutional, HEENT, cardiovascular, pulmonary, gi and " gu systems are negative, except as otherwise noted.      Objective    /76 (BP Location: Right arm, Patient Position: Sitting, Cuff Size: Adult Regular)   Pulse 77   Temp 98.1  F (36.7  C) (Temporal)   Wt 64.9 kg (143 lb)   SpO2 96%   BMI 29.89 kg/m    Body mass index is 29.89 kg/m .  Physical Exam   GENERAL: alert and no distress  ABDOMEN: soft, nontender, no hepatosplenomegaly, no masses and bowel sounds normal  MS: no gross musculoskeletal defects noted, no edema  NEURO: Normal strength and tone, mentation intact and speech normal  PSYCH: mentation appears normal, affect normal/bright    Results for orders placed or performed in visit on 08/19/24   URINALYSIS, ROUTINE ([facility])     Status: Abnormal   Result Value Ref Range    Color Urine Yellow     Appearance Urine Clear     Glucose Urine Neg mg/dL    Bilirubin Urine Neg     Ketones Urine Neg mg/dL    Specific Gravity Urine 1.015     Blood Urine Large (A)     pH Urine 7.0 pH    Protein Urine neg neg - neg mg/dL    Urobilinogen Urine 0.2 EU/dL    Nitrite Urine Neg     Leukocytes neg     Wbc, Urine Micro 2-3 (A) neg - 2    RBC Micro Urine 5-10 (A) neg - 2    EP/HPF neg     Bacteria Urine few (A) neg - neg    Casts/LPF neg     Miscellaneous neg    URINE CULTURE AEROBIC BACTERIAL ([facility])     Status: None   Result Value Ref Range    Urine Voided Culture SEE NOTE     Narrative    FASTING: UNKNOWN   HEMOGRAM PLATELET DIFF ([facility])     Status: None   Result Value Ref Range    WBC 8.9 4.0 - 11 10*9/L    RBC Count 4.37 3.8 - 5.2 10*12/L    Hemoglobin 13.9 11.7 - 15.7 g/dL    Hematocrit 42.6 35.0 - 47.0 %    MCV 97.4 78 - 100 fL    MCH 31.8 26 - 33 pg    MCHC 32.6 31 - 36 g/dL    Platelet Count 314 150 - 375 10^9/L    % Granulocytes 70.4 %    % Lymphocytes 21.6 %    % Monocytes 8.0 %   TSH WITH FREE T4 REFLEX ([facility])     Status: None   Result Value Ref Range    TSH 2.75 0.40 - 4.50 mIU/L    Narrative    FASTING: UNKNOWN

## 2024-08-19 NOTE — PROGRESS NOTES
"Assessment & Plan   Problem List Items Addressed This Visit       Anxiety    Relevant Medications    venlafaxine (EFFEXOR XR) 75 MG 24 hr capsule    Borderline diabetes    Relevant Orders    HEMOGLOBIN A1C ([facility]) (Completed)    VENOUS COLLECTION (Completed)     Other Visit Diagnoses       Microscopic hematuria    -  Primary    Relevant Orders    Adult Urology  Referral - To a [facility] Location           1. Anxiety  Refilled. This is working well.  - venlafaxine (EFFEXOR XR) 75 MG 24 hr capsule; Take 1 capsule (75 mg) by mouth daily.  Dispense: 90 capsule; Refill: 1    2. Microscopic hematuria  She want to see different urologist, I discussed results with her today.  - Adult Urology  Referral - To a [facility] Location; Future    3. Borderline diabetes  Borderline range, stable. Discussed working on diet and exercise, weight loss. Regular monitoring of levels.  - HEMOGLOBIN A1C ([facility])  - VENOUS COLLECTION            BMI  Estimated body mass index is 29.89 kg/m  as calculated from the following:    Height as of 5/6/24: 1.473 m (4' 10\").    Weight as of this encounter: 64.9 kg (143 lb).         FUTURE APPOINTMENTS:       - Follow-up visit in 6 months. We will continue to manage her chronic medical care.    No follow-ups on file.    Dora Marie MD  [facility]    Subjective     Nursing Notes:   Jenn Tong CMA  8/23/2024  8:35 AM  Signed  Chief Complaint   Patient presents with    Recheck Medication     Refill venlafaxine     Pre-visit Screening:  Immunizations:  not up to date - tdap at pharmacy  Colonoscopy:  is up to date  Mammogram: pt declines  Asthma Action Test/Plan:  NA  PHQ9:  Done today  GAD7:  Done today  Questioned patient about current smoking habits Pt. Smokes.  Ok to leave detailed message on voice mail for today's visit only Yes, phone # [phone]       Eliza Saenz is a 69 year old female who presents to clinic today for the following " health issues   HPI     Denies SI.  Here for refill on her effexor. This is helping with mood. Would like to continue with the same dose.  Had hematuria--referred to see urology, she doesn't want to see [facility], wants to see [facility]. She has seen them in the past. Wants change in referral. Will also be doing a pelvic us through ob/gyn specialists. With a followup apt. She had some postmenopausal vaginal bleeding.  Also previously had hgba1c 6.2%, borderline diabetes, this is new for her. Hasn't followed up on this.        Review of Systems   Constitutional, HEENT, cardiovascular, pulmonary, gi and gu systems are negative, except as otherwise noted.      Objective    /82 (BP Location: Right arm, Patient Position: Sitting, Cuff Size: Adult Large)   Pulse 97   Temp 97.9  F (36.6  C) (Temporal)   Wt 64.9 kg (143 lb)   SpO2 98%   BMI 29.89 kg/m    Body mass index is 29.89 kg/m .  Physical Exam   GENERAL: alert and no distress  MS: no gross musculoskeletal defects noted, no edema  NEURO: Normal strength and tone, mentation intact and speech normal  PSYCH: mentation appears normal, affect normal/bright    Results for orders placed or performed in visit on 08/23/24   HEMOGLOBIN A1C ([facility])     Status: Abnormal   Result Value Ref Range    Hemoglobin A1C 6.1 (A) 4 - 5.6 %

## 2024-08-19 NOTE — NURSING NOTE
Chief Complaint   Patient presents with    UTI     Discomfort after urinating, increased urinary frequency, history of lichens sclerosis     Consult     She has been advised she has an underactive thyroid and borderline anemia, she would like these rechecked     Vaginal Bleeding     Postmenopausal bleeding      Pre-visit Screening:  Immunizations:  not up to date - shingrix at pharmacy  Colonoscopy:  is up to date  Mammogram: is due and to be scheduled by patient for later completion  Asthma Action Test/Plan:  NA  PHQ9:  NA  GAD7:  NA  Questioned patient about current smoking habits Pt. smokes   Ok to leave detailed message on voice mail for today's visit only Yes, phone # [phone]

## 2024-08-20 LAB — TSH SERPL-ACNC: 2.75 MIU/L (ref 0.4–4.5)

## 2024-08-21 LAB — URINE VOIDED CULTURE: NORMAL

## 2024-08-23 ENCOUNTER — MYC MEDICAL ADVICE (OUTPATIENT)
Dept: CARDIOLOGY | Facility: CLINIC | Age: 69
End: 2024-08-23
Payer: COMMERCIAL

## 2024-08-23 ENCOUNTER — OFFICE VISIT (OUTPATIENT)
Dept: FAMILY MEDICINE | Facility: CLINIC | Age: 69
End: 2024-08-23

## 2024-08-23 VITALS
HEART RATE: 97 BPM | DIASTOLIC BLOOD PRESSURE: 82 MMHG | BODY MASS INDEX: 29.89 KG/M2 | TEMPERATURE: 97.9 F | WEIGHT: 143 LBS | SYSTOLIC BLOOD PRESSURE: 124 MMHG | OXYGEN SATURATION: 98 %

## 2024-08-23 DIAGNOSIS — F41.9 ANXIETY: ICD-10-CM

## 2024-08-23 DIAGNOSIS — I25.10 ATHEROSCLEROSIS OF NATIVE CORONARY ARTERY OF NATIVE HEART WITHOUT ANGINA PECTORIS: Primary | ICD-10-CM

## 2024-08-23 DIAGNOSIS — R31.29 MICROSCOPIC HEMATURIA: Primary | ICD-10-CM

## 2024-08-23 DIAGNOSIS — R73.03 BORDERLINE DIABETES: ICD-10-CM

## 2024-08-23 LAB — HEMOGLOBIN A1C: 6.1 % (ref 4–5.6)

## 2024-08-23 PROCEDURE — 83036 HEMOGLOBIN GLYCOSYLATED A1C: CPT | Performed by: FAMILY MEDICINE

## 2024-08-23 PROCEDURE — G2211 COMPLEX E/M VISIT ADD ON: HCPCS | Performed by: FAMILY MEDICINE

## 2024-08-23 PROCEDURE — 36415 COLL VENOUS BLD VENIPUNCTURE: CPT | Performed by: FAMILY MEDICINE

## 2024-08-23 PROCEDURE — 99214 OFFICE O/P EST MOD 30 MIN: CPT | Performed by: FAMILY MEDICINE

## 2024-08-23 RX ORDER — VENLAFAXINE HYDROCHLORIDE 75 MG/1
75 CAPSULE, EXTENDED RELEASE ORAL DAILY
Qty: 90 CAPSULE | Refills: 1 | Status: SHIPPED | OUTPATIENT
Start: 2024-08-23

## 2024-08-23 ASSESSMENT — ANXIETY QUESTIONNAIRES
7. FEELING AFRAID AS IF SOMETHING AWFUL MIGHT HAPPEN: MORE THAN HALF THE DAYS
6. BECOMING EASILY ANNOYED OR IRRITABLE: MORE THAN HALF THE DAYS
GAD7 TOTAL SCORE: 15
IF YOU CHECKED OFF ANY PROBLEMS ON THIS QUESTIONNAIRE, HOW DIFFICULT HAVE THESE PROBLEMS MADE IT FOR YOU TO DO YOUR WORK, TAKE CARE OF THINGS AT HOME, OR GET ALONG WITH OTHER PEOPLE: SOMEWHAT DIFFICULT
GAD7 TOTAL SCORE: 15
3. WORRYING TOO MUCH ABOUT DIFFERENT THINGS: MORE THAN HALF THE DAYS
2. NOT BEING ABLE TO STOP OR CONTROL WORRYING: MORE THAN HALF THE DAYS
5. BEING SO RESTLESS THAT IT IS HARD TO SIT STILL: MORE THAN HALF THE DAYS
1. FEELING NERVOUS, ANXIOUS, OR ON EDGE: NEARLY EVERY DAY

## 2024-08-23 ASSESSMENT — PATIENT HEALTH QUESTIONNAIRE - PHQ9
SUM OF ALL RESPONSES TO PHQ QUESTIONS 1-9: 18
5. POOR APPETITE OR OVEREATING: MORE THAN HALF THE DAYS

## 2024-08-23 NOTE — NURSING NOTE
Chief Complaint   Patient presents with    Recheck Medication     Refill venlafaxine     Pre-visit Screening:  Immunizations:  not up to date - tdap at pharmacy  Colonoscopy:  is up to date  Mammogram: pt declines  Asthma Action Test/Plan:  NA  PHQ9:  Done today  GAD7:  Done today  Questioned patient about current smoking habits Pt. Smokes.  Ok to leave detailed message on voice mail for today's visit only Yes, phone # [phone]

## 2024-08-26 RX ORDER — METOPROLOL SUCCINATE 25 MG/1
25 TABLET, EXTENDED RELEASE ORAL DAILY
Qty: 20 TABLET | Refills: 0 | Status: SHIPPED | OUTPATIENT
Start: 2024-08-26 | End: 2024-09-09

## 2024-08-26 NOTE — TELEPHONE ENCOUNTER
My chart message from patient:  Estevan Anders- I am not due to see you until 9/9  I believe. However due to my choice of changing doctors, I have run out of metoprolol-25mg (I ran out 8/21). I ve also been experiencing weird side effects but we will be chatting about that at my appt. I don t feel comfortable being without 1 of my meds. Is there anyway you could send 15-20 of them to the [facility] to tide me over? I look forward to meeting you at my appointment. Chu Saenz  =====    Patient to see HAILEY Martita Chauhan on 9/9/2024 with labs on 9/4/2024.  [facility] Refill Guideline reviewed.  Medication meets criteria for refill.   Rx escripted as requested.    Reply to patient:    EstevanMs. Morenoely,    Dr. Almonte saw you in March, so we can cover the requested #20 pills of the metoprolol.    Thank you  Team 2 R.N.s  [phone]

## 2024-09-04 ENCOUNTER — LAB (OUTPATIENT)
Dept: LAB | Facility: CLINIC | Age: 69
End: 2024-09-04
Payer: COMMERCIAL

## 2024-09-04 DIAGNOSIS — I25.119 ATHEROSCLEROSIS OF NATIVE CORONARY ARTERY OF NATIVE HEART WITH ANGINA PECTORIS (H): ICD-10-CM

## 2024-09-04 LAB
ALT SERPL W P-5'-P-CCNC: 20 U/L (ref 0–50)
ANION GAP SERPL CALCULATED.3IONS-SCNC: 8 MMOL/L (ref 7–15)
BUN SERPL-MCNC: 17.8 MG/DL (ref 8–23)
CALCIUM SERPL-MCNC: 9.7 MG/DL (ref 8.8–10.4)
CHLORIDE SERPL-SCNC: 103 MMOL/L (ref 98–107)
CHOLEST SERPL-MCNC: 122 MG/DL
CREAT SERPL-MCNC: 1.17 MG/DL (ref 0.51–0.95)
EGFRCR SERPLBLD CKD-EPI 2021: 50 ML/MIN/1.73M2
FASTING STATUS PATIENT QL REPORTED: YES
GLUCOSE SERPL-MCNC: 134 MG/DL (ref 70–99)
HCO3 SERPL-SCNC: 29 MMOL/L (ref 22–29)
HDLC SERPL-MCNC: 44 MG/DL
LDLC SERPL CALC-MCNC: 47 MG/DL
NONHDLC SERPL-MCNC: 78 MG/DL
POTASSIUM SERPL-SCNC: 4.2 MMOL/L (ref 3.4–5.3)
SODIUM SERPL-SCNC: 140 MMOL/L (ref 135–145)
TRIGL SERPL-MCNC: 154 MG/DL

## 2024-09-04 PROCEDURE — 80061 LIPID PANEL: CPT | Performed by: INTERNAL MEDICINE

## 2024-09-04 PROCEDURE — 84460 ALANINE AMINO (ALT) (SGPT): CPT | Performed by: INTERNAL MEDICINE

## 2024-09-04 PROCEDURE — 36415 COLL VENOUS BLD VENIPUNCTURE: CPT | Performed by: INTERNAL MEDICINE

## 2024-09-04 PROCEDURE — 80048 BASIC METABOLIC PNL TOTAL CA: CPT | Performed by: INTERNAL MEDICINE

## 2024-09-08 NOTE — PROGRESS NOTES
CARDIOLOGY CLINIC NOTE    PRIMARY CARDIOLOGIST  Dr. Almonte     PRIMARY CARE PHYSICIAN:  Dora Marie    HISTORY OF PRESENT ILLNESS:  Eliza Saenz is a very pleasant 69-year-old female with a past medical history significant for current tobacco use, hypertension, hyperlipidemia, CAD w/ known  of the RCA with collaterals and bifurcation stenting to the circumflex and OM (4 stents), bilateral carotid endarterectomy in 2016, and family history of CAD.     She was last seen on 3/6/2024 to establish cardiology care with Dr. Almonte. She has previously followed with [facility] ([place]).  She did not endorse any cardiac complaint.  She was transitioned to clopidogrel as monotherapy.     Her cardiac workup consists of a coronary angiogram in 2019 showed multivessel disease consisting of 100% stenosis in the distal RCA which is chronically occluded with mature left-to-right collaterals, left main and LAD had mild stenosis and circumflex was subtotally occluded prior to the OM1 bifurcation followed by a 2.5 x 38 mm SHILPA and 2.5 x 12 mm SHILPA to the proximal circumflex and a 2.25 x 12 and 2.25 x 15 SHILPA to the mid circumflex.    Echocardiogram in 2019 showed a normal ejection fraction estimated at 55%, posterior wall and basal inferior segment abnormalities and no valvular disease.    Carotid ultrasound showed bilateral stenosis less than 50%.    Echocardiogram in 2022 showed a normal ejection fraction estimated at 66%, normal RV size and function, a mildly enlarged left atrium and no valvular disease.  There was some mild increase in pulmonary pressures (5 mmHg plus RAP)    She returns to the office today accompanied by her sister for 6-month follow-up.  She remains stable on a cardiac standpoint, denies chest pain, shortness of breath, palpitations, PND, orthopnea, presyncope, syncope or lower extremity edema.  She is does note a single episode of chest discomfort many months ago that resolved with 1 sublingual nitroglycerin.   Her primary complaint is abnormal vaginal bleeding that started in June.  She is currently working with gynecology with plans for a biopsy in the near future.    Blood pressure is well-controlled at 128/78  Recent blood work showed a mildly elevated creatinine of 1.17 and GFR 50, otherwise normal BMP, A1c 6.1.   Lipid panel showed total cholesterol 122, HDL 44, LDL 47 and triglycerides 154  Weight stable at 142 pounds    Unfortunately, she continues to smoke 1 pack/day  Compliant with all medications.      MEDICATIONS:  Current Outpatient Medications   Medication Sig Dispense Refill    atorvastatin (LIPITOR) 80 MG tablet Take 1 tablet (80 mg) by mouth daily. Filled by Cardiology 90 tablet 3    Calcium Carbonate (CALCIUM 600 PO) Take by mouth daily      clopidogrel (PLAVIX) 75 MG tablet Take 1 tablet (75 mg) by mouth daily. Filled by Cardiology 90 tablet 3    ezetimibe (ZETIA) 10 MG tablet Take 1 tablet (10 mg) by mouth daily. Filled by Cardiology 90 tablet 3    metoprolol succinate ER (TOPROL XL) 25 MG 24 hr tablet Take 1 tablet (25 mg) by mouth daily. 90 tablet 3    nitroGLYcerin (NITROSTAT) 0.4 MG sublingual tablet Place 1 tablet (0.4 mg) under the tongue every 5 minutes as needed for chest pain. For chest pain place 1 tablet under the tongue every 5 minutes for 3 doses. If symptoms persist 5 minutes after 1st dose call 911. 25 tablet 1    triamcinolone (KENALOG) 0.1 % external cream Apply topically 2 times daily.      venlafaxine (EFFEXOR XR) 75 MG 24 hr capsule Take 1 capsule (75 mg) by mouth daily. 90 capsule 1    vitamin D3 (CHOLECALCIFEROL) 50 mcg (2000 units) tablet Take 1 tablet by mouth daily      clobetasol (TEMOVATE) 0.05 % external ointment Apply topically three times a week (Patient not taking: Reported on 9/9/2024)       No current facility-administered medications for this visit.       SOCIAL HISTORY:  I have reviewed this patient's social history and updated it with pertinent information if needed.  Eliza Saenz  reports that she has been smoking cigarettes. She started smoking about 50 years ago. She has a 50.7 pack-year smoking history. She has been exposed to tobacco smoke. She has never used smokeless tobacco. She reports that she does not drink alcohol and does not use drugs.    PHYSICAL EXAM:  Pulse:  [72] 72  BP: (128-140)/(70-78) 128/78  142 lbs 0 oz    Constitutional: alert, no distress  Respiratory: Good bilateral air entry  Cardiovascular: Regular rate and rhythm  GI: nondistended  Neuropsychiatric: appropriate affact    ASSESSMENT/PLAN:  Pertinent issues addressed/ reviewed during this cardiology visit  Coronary artery disease - s/p  2.5 x 38 mm SHILPA and 2.5 x 12 mm SHILPA to the proximal circumflex and a 2.25 x 12 and 2.25 x 15 SHILPA to the mid circumflex.  of the RCA with collaterals.  Well-preserved ejection fraction.  No ischemic symptoms.  Continue clopidogrel as monotherapy, metoprolol, and statin.  Encourage 30 minutes of exercise minimum.  Carotid disease -status post bilateral endarterectomy in 2016.   Hypertension -well-controlled  Hyperlipidemia-LDL at goal, continue atorvastatin and ezetimibe  Tobacco abuse -currently smokes 1 pack/day.  Encourage cessation.  Abnormal vaginal bleeding -currently following with gynecology with plans for vaginal biopsy.    It was a pleasure seeing this patient in clinic today. Please do not hesitate to contact me with any future questions.     BANG Lozano, CNP  Cardiology - [facility]  09/09/2024       The level of medical decision making during this visit was of moderate complexity.    This note was completed in part using dictation via the Dragon voice recognition software. Some word and grammatical errors may occur and must be interpreted in the appropriate clinical context.  If there are any questions pertaining to this issue, please contact me for further clarification.

## 2024-09-09 ENCOUNTER — OFFICE VISIT (OUTPATIENT)
Dept: CARDIOLOGY | Facility: CLINIC | Age: 69
End: 2024-09-09
Payer: COMMERCIAL

## 2024-09-09 VITALS
DIASTOLIC BLOOD PRESSURE: 78 MMHG | BODY MASS INDEX: 29.81 KG/M2 | HEART RATE: 72 BPM | SYSTOLIC BLOOD PRESSURE: 128 MMHG | HEIGHT: 58 IN | WEIGHT: 142 LBS

## 2024-09-09 DIAGNOSIS — I65.23 CAROTID STENOSIS, BILATERAL: ICD-10-CM

## 2024-09-09 DIAGNOSIS — I25.10 CORONARY ARTERY DISEASE INVOLVING NATIVE CORONARY ARTERY OF NATIVE HEART WITHOUT ANGINA PECTORIS: Primary | ICD-10-CM

## 2024-09-09 DIAGNOSIS — Z72.0 TOBACCO ABUSE: ICD-10-CM

## 2024-09-09 DIAGNOSIS — I10 ESSENTIAL HYPERTENSION: ICD-10-CM

## 2024-09-09 DIAGNOSIS — E78.2 MIXED HYPERLIPIDEMIA: ICD-10-CM

## 2024-09-09 PROCEDURE — 99214 OFFICE O/P EST MOD 30 MIN: CPT | Performed by: NURSE PRACTITIONER

## 2024-09-09 RX ORDER — NITROGLYCERIN 0.4 MG/1
0.4 TABLET SUBLINGUAL EVERY 5 MIN PRN
Qty: 25 TABLET | Refills: 1 | Status: SHIPPED | OUTPATIENT
Start: 2024-09-09

## 2024-09-09 RX ORDER — EZETIMIBE 10 MG/1
10 TABLET ORAL DAILY
Qty: 90 TABLET | Refills: 3 | Status: SHIPPED | OUTPATIENT
Start: 2024-09-09

## 2024-09-09 RX ORDER — TRIAMCINOLONE ACETONIDE 1 MG/G
CREAM TOPICAL 2 TIMES DAILY
COMMUNITY

## 2024-09-09 RX ORDER — CLOPIDOGREL BISULFATE 75 MG/1
75 TABLET ORAL DAILY
Qty: 90 TABLET | Refills: 3 | Status: SHIPPED | OUTPATIENT
Start: 2024-09-09

## 2024-09-09 RX ORDER — METOPROLOL SUCCINATE 25 MG/1
25 TABLET, EXTENDED RELEASE ORAL DAILY
Qty: 90 TABLET | Refills: 3 | Status: SHIPPED | OUTPATIENT
Start: 2024-09-09

## 2024-09-09 RX ORDER — ATORVASTATIN CALCIUM 80 MG/1
80 TABLET, FILM COATED ORAL DAILY
Qty: 90 TABLET | Refills: 3 | Status: SHIPPED | OUTPATIENT
Start: 2024-09-09

## 2024-09-09 NOTE — LETTER
9/9/2024    Dora Marie MD  [address]  [facility] [address]    RE: Eliza Saenz       Dear Colleague,     I had the pleasure of seeing Eliza Saenz in the [facility].  CARDIOLOGY CLINIC NOTE    PRIMARY CARDIOLOGIST  Dr. Almonte     PRIMARY CARE PHYSICIAN:  Dora Marie    HISTORY OF PRESENT ILLNESS:  Eliza Saenz is a very pleasant 69-year-old female with a past medical history significant for current tobacco use, hypertension, hyperlipidemia, CAD w/ known  of the RCA with collaterals and bifurcation stenting to the circumflex and OM (4 stents), bilateral carotid endarterectomy in 2016, and family history of CAD.     She was last seen on 3/6/2024 to establish cardiology care with Dr. Almonte. She has previously followed with [facility] ([place]).  She did not endorse any cardiac complaint.  She was transitioned to clopidogrel as monotherapy.     Her cardiac workup consists of a coronary angiogram in 2019 showed multivessel disease consisting of 100% stenosis in the distal RCA which is chronically occluded with mature left-to-right collaterals, left main and LAD had mild stenosis and circumflex was subtotally occluded prior to the OM1 bifurcation followed by a 2.5 x 38 mm SHILPA and 2.5 x 12 mm SHILPA to the proximal circumflex and a 2.25 x 12 and 2.25 x 15 HSILPA to the mid circumflex.    Echocardiogram in 2019 showed a normal ejection fraction estimated at 55%, posterior wall and basal inferior segment abnormalities and no valvular disease.    Carotid ultrasound showed bilateral stenosis less than 50%.    Echocardiogram in 2022 showed a normal ejection fraction estimated at 66%, normal RV size and function, a mildly enlarged left atrium and no valvular disease.  There was some mild increase in pulmonary pressures (5 mmHg plus RAP)    She returns to the office today accompanied by her sister for 6-month follow-up.  She remains stable on a cardiac standpoint, denies chest pain, shortness  of breath, palpitations, PND, orthopnea, presyncope, syncope or lower extremity edema.  She is does note a single episode of chest discomfort many months ago that resolved with 1 sublingual nitroglycerin.  Her primary complaint is abnormal vaginal bleeding that started in June.  She is currently working with gynecology with plans for a biopsy in the near future.    Blood pressure is well-controlled at 128/78  Recent blood work showed a mildly elevated creatinine of 1.17 and GFR 50, otherwise normal BMP, A1c 6.1.   Lipid panel showed total cholesterol 122, HDL 44, LDL 47 and triglycerides 154  Weight stable at 142 pounds    Unfortunately, she continues to smoke 1 pack/day  Compliant with all medications.      MEDICATIONS:  Current Outpatient Medications   Medication Sig Dispense Refill     atorvastatin (LIPITOR) 80 MG tablet Take 1 tablet (80 mg) by mouth daily. Filled by Cardiology 90 tablet 3     Calcium Carbonate (CALCIUM 600 PO) Take by mouth daily       clopidogrel (PLAVIX) 75 MG tablet Take 1 tablet (75 mg) by mouth daily. Filled by Cardiology 90 tablet 3     ezetimibe (ZETIA) 10 MG tablet Take 1 tablet (10 mg) by mouth daily. Filled by Cardiology 90 tablet 3     metoprolol succinate ER (TOPROL XL) 25 MG 24 hr tablet Take 1 tablet (25 mg) by mouth daily. 90 tablet 3     nitroGLYcerin (NITROSTAT) 0.4 MG sublingual tablet Place 1 tablet (0.4 mg) under the tongue every 5 minutes as needed for chest pain. For chest pain place 1 tablet under the tongue every 5 minutes for 3 doses. If symptoms persist 5 minutes after 1st dose call 911. 25 tablet 1     triamcinolone (KENALOG) 0.1 % external cream Apply topically 2 times daily.       venlafaxine (EFFEXOR XR) 75 MG 24 hr capsule Take 1 capsule (75 mg) by mouth daily. 90 capsule 1     vitamin D3 (CHOLECALCIFEROL) 50 mcg (2000 units) tablet Take 1 tablet by mouth daily       clobetasol (TEMOVATE) 0.05 % external ointment Apply topically three times a week (Patient not  taking: Reported on 9/9/2024)       No current facility-administered medications for this visit.       SOCIAL HISTORY:  I have reviewed this patient's social history and updated it with pertinent information if needed. Eliza Saenz  reports that she has been smoking cigarettes. She started smoking about 50 years ago. She has a 50.7 pack-year smoking history. She has been exposed to tobacco smoke. She has never used smokeless tobacco. She reports that she does not drink alcohol and does not use drugs.    PHYSICAL EXAM:  Pulse:  [72] 72  BP: (128-140)/(70-78) 128/78  142 lbs 0 oz    Constitutional: alert, no distress  Respiratory: Good bilateral air entry  Cardiovascular: Regular rate and rhythm  GI: nondistended  Neuropsychiatric: appropriate affact    ASSESSMENT/PLAN:  Pertinent issues addressed/ reviewed during this cardiology visit  Coronary artery disease - s/p  2.5 x 38 mm SHILPA and 2.5 x 12 mm SHILPA to the proximal circumflex and a 2.25 x 12 and 2.25 x 15 SHILPA to the mid circumflex.  of the RCA with collaterals.  Well-preserved ejection fraction.  No ischemic symptoms.  Continue clopidogrel as monotherapy, metoprolol, and statin.  Encourage 30 minutes of exercise minimum.  Carotid disease -status post bilateral endarterectomy in 2016.   Hypertension -well-controlled  Hyperlipidemia-LDL at goal, continue atorvastatin and ezetimibe  Tobacco abuse -currently smokes 1 pack/day.  Encourage cessation.  Abnormal vaginal bleeding -currently following with gynecology with plans for vaginal biopsy.    It was a pleasure seeing this patient in clinic today. Please do not hesitate to contact me with any future questions.     BANG Lozano, CNP  Cardiology - [facility]  09/09/2024       The level of medical decision making during this visit was of moderate complexity.    This note was completed in part using dictation via the Dragon voice recognition software. Some word and grammatical errors may occur and must be  interpreted in the appropriate clinical context.  If there are any questions pertaining to this issue, please contact me for further clarification.      Thank you for allowing me to participate in the care of your patient.      Sincerely,     BANG Lozano [facility]  cc:   Cole Almonte MD  [address]  [place] [id number]

## 2024-09-09 NOTE — PATIENT INSTRUCTIONS
Thanks for participating in a office visit with the [facility] clinic today.    Doing well on a cardiac standpoint   Blood pressures well controlled   Lipid panel is stable.   Continue current medical therapy  Walk at least 30 min daily.   Stop smoking.       Follow up in 1 year with Dr. Marie to establish care.     Please call my nurse at   [phone]. Call with any questions or concerns.    Scheduling phone number: [phone]  Reminder: Please bring in all current medications, over the counter supplements and vitamin bottles to your next appointment.

## 2024-09-11 NOTE — PROGRESS NOTES
Preoperative Evaluation  [facility]  [address] [place] [address]  [address]  [facility] [address]  Phone: [phone]  Fax: [phone]  Primary Provider: Dora Marie MD  Pre-op Performing Provider: Dora Marie MD  Sep 12, 2024               9/12/2024   Surgical Information   What procedure is being done? Hysteroscopy Dilation and Curettage   Facility or [facility] where procedure/surgery will be performed: [facility]   Who is doing the procedure / surgery? Dr. Beck   Date of surgery / procedure: 09/17/24   Time of surgery / procedure: AM   Where do you plan to recover after surgery? at home with family      Fax number for surgical facility: Note does not need to be faxed, will be available electronically in Epic.    Assessment & Plan     The proposed surgical procedure is considered LOW risk.    Problem List Items Addressed This Visit       Tobacco abuse    Mixed hyperlipidemia--managed by cardiology    Migraine headache    Essential hypertension    Depression    Coronary artery disease involving native coronary artery of native heart without angina pectoris    Carotid stenosis, bilateral    Anxiety    Borderline diabetes    Snores    History of common carotid artery stent placement    H/O heart artery stent    Renal insufficiency     Other Visit Diagnoses       Preoperative examination    -  Primary    Relevant Orders    VENOUS COLLECTION (Completed)    HEMOGRAM PLATELET DIFF (BFP) (Completed)    Abnormal vaginal bleeding                  1. Preoperative examination    - VENOUS COLLECTION  - HEMOGRAM PLATELET DIFF (BFP)    2. Abnormal vaginal bleeding      3. Coronary artery disease involving native coronary artery of native heart without angina pectoris  Recently has seen her cardiologist, no current symptoms, stable.     4. Essential hypertension  Controlled.    5. Borderline diabetes  Controlled.    6. Mixed hyperlipidemia--managed by cardiology      7. Tobacco abuse  Is still  smoking.    8. Other depression  Controlled on medications.    9. Anxiety      10. Other migraine without status migrainosus, not intractable      11. Carotid stenosis, bilateral      12. Snores      13. History of common carotid artery stent placement      14. H/O heart artery stent      15. Renal insufficiency        - No identified additional risk factors other than previously addressed    Antiplatelet or Anticoagulation Medication Instructions   - clopidrogel (Plavix), prasugrel (Effient), ticagrelor (Brilinta): No contraindication to stopping Plavix, DO NOT TAKE 5-7 days before surgery.     Additional Medication Instructions  Take all scheduled medications on the day of surgery    Recommendation  Approval given to proceed with proposed procedure, without further diagnostic evaluation.    Wes Kay is a 69 year old, presenting for the following:  Pre-Op Exam        HPI related to upcoming procedure: here with sister.  Will be doing endometrial biopsy and hysteroscopy for abnormal postmenopausal vaginal bleeding.  Has had the bleeding several times since June of this year.  Every once in awhile has some cramping.           9/12/2024   Pre-Op Questionnaire   Have you ever had a heart attack or stroke? No   Have you ever had surgery on your heart or blood vessels, such as a stent placement, a coronary artery bypass, or surgery on an artery in your head, neck, heart, or legs? (!) YES has 4 stents in heart circumflex in 2017 or 2019, also stents in carotid arteries   Do you have chest pain with activity? No   Do you have a history of heart failure? No   Do you currently have a cold, bronchitis or symptoms of other infection? No   Do you have a cough, shortness of breath, or wheezing? No   Do you or anyone in your family have previous history of blood clots? No   Do you or does anyone in your family have a serious bleeding problem such as prolonged bleeding following surgeries or cuts? No   Have you ever had  problems with anemia or been told to take iron pills? No   Have you had any abnormal blood loss such as black, tarry or bloody stools, or abnormal vaginal bleeding? No   Have you ever had a blood transfusion? No   Are you willing to have a blood transfusion if it is medically needed before, during, or after your surgery? Yes   Have you or any of your relatives ever had problems with anesthesia? No   Do you have sleep apnea, excessive snoring or daytime drowsiness? (!) YES snores   Do you have a CPAP machine? (!) NO    Do you have any artifical heart valves or other implanted medical devices like a pacemaker, defibrillator, or continuous glucose monitor? No   Do you have artificial joints? No   Are you allergic to latex? No      Health Care Directive  Patient does not have a Health Care Directive or Living Will: Discussed advance care planning with patient; information given to patient to review.    Preoperative Review of    reviewed - narcotics 09/23 and 12/23 after surgery, not currently taking      Status of Chronic Conditions:  CAD - Patient has a longstanding history of moderate-severe CAD. Patient denies recent chest pain or NTG use, denies exercise induced dyspnea or PND.  DEPRESSION - Patient has a long history of Depression of moderate severity requiring medication for control with recent symptoms being stable..Current symptoms of depression include none.     HYPERLIPIDEMIA - Patient has a long history of significant Hyperlipidemia requiring medication for treatment with recent good control. Patient reports no problems or side effects with the medication.     HYPERTENSION - Patient has longstanding history of HTN , currently denies any symptoms referable to elevated blood pressure. Specifically denies chest pain, palpitations, dyspnea, orthopnea, PND or peripheral edema. Blood pressure readings have been in normal range. Current medication regimen is as listed below. Patient denies any side effects of  medication.     Patient Active Problem List    Diagnosis Date Noted    Snores 2024     Priority: Medium    History of common carotid artery stent placement 2024     Priority: Medium    H/O heart artery stent 2024     Priority: Medium    Renal insufficiency 2024     Priority: Medium    Borderline diabetes 2024     Priority: Medium    Atrophy of vagina 2022     Priority: Medium    Palpitations 05/10/2021     Priority: Medium    SAEED (dyspnea on exertion) 05/10/2021     Priority: Medium    Coronary artery disease involving native coronary artery of native heart without angina pectoris 05/10/2021     Priority: Medium     S/P stenting of Circ  May 21st, 2019      Abnormal cardiovascular stress test 05/10/2021     Priority: Medium    ACP (advance care planning) 05/10/2021     Priority: Medium    Anxiety 05/10/2021     Priority: Medium    Research study patient 2019     Priority: Medium     A post-approval study of the Medtronic Resolute TALISHA Zotoroliums-Eluting Coronary Stent System  To assess the continued safety and efficacy of the Resolute [place] stent for the treatment of lesions in the coronary arteries amenable to treatment with a Resolute Talisha 2.0mm-5.0mm. If hospitalized for cardiac reasons please contact     -  Daniela Copeland phone: [phone]      Essential hypertension 2016     Priority: Medium    Tobacco abuse 2016     Priority: Medium    Mixed hyperlipidemia--managed by cardiology 2016     Priority: Medium    Migraine headache 2016     Priority: Medium    Depression 2016     Priority: Medium    Carotid stenosis, bilateral 2016     Priority: Medium      No past medical history on file.  Past Surgical History:   Procedure Laterality Date    CAROTID ENDARTERECTOMY Bilateral      SECTION      FOOT SURGERY Left     HEART CATH STENT COR W/WO PTCA  2019    SHOULDER SURGERY Right     WRIST SURGERY  Left      Current Outpatient Medications   Medication Sig Dispense Refill    atorvastatin (LIPITOR) 80 MG tablet Take 1 tablet (80 mg) by mouth daily. Filled by Cardiology 90 tablet 3    Calcium Carbonate (CALCIUM 600 PO) Take by mouth daily      clobetasol (TEMOVATE) 0.05 % external ointment Apply topically three times a week      clopidogrel (PLAVIX) 75 MG tablet Take 1 tablet (75 mg) by mouth daily. Filled by Cardiology 90 tablet 3    ezetimibe (ZETIA) 10 MG tablet Take 1 tablet (10 mg) by mouth daily. Filled by Cardiology 90 tablet 3    metoprolol succinate ER (TOPROL XL) 25 MG 24 hr tablet Take 1 tablet (25 mg) by mouth daily. 90 tablet 3    nitroGLYcerin (NITROSTAT) 0.4 MG sublingual tablet Place 1 tablet (0.4 mg) under the tongue every 5 minutes as needed for chest pain. For chest pain place 1 tablet under the tongue every 5 minutes for 3 doses. If symptoms persist 5 minutes after 1st dose call 911. 25 tablet 1    triamcinolone (KENALOG) 0.1 % external cream Apply topically 2 times daily.      venlafaxine (EFFEXOR XR) 75 MG 24 hr capsule Take 1 capsule (75 mg) by mouth daily. 90 capsule 1    vitamin D3 (CHOLECALCIFEROL) 50 mcg (2000 units) tablet Take 1 tablet by mouth daily         Allergies   Allergen Reactions    Penicillins Hives    Sulfa Antibiotics Hives    Tobramycin Rash        Social History     Tobacco Use    Smoking status: Every Day     Current packs/day: 1.00     Average packs/day: 1 pack/day for 50.7 years (50.7 ttl pk-yrs)     Types: Cigarettes     Start date: 1974     Passive exposure: Current    Smokeless tobacco: Never   Substance Use Topics    Alcohol use: Never     Family History   Problem Relation Age of Onset    Heart Disease Mother     Depression Mother     Hypertension Mother     Alcoholism Father     Heart Disease Father     Skin Cancer Sister     Heart Disease Brother     Coronary Artery Disease Brother     Cerebrovascular Disease Brother      History   Drug Use Unknown        "      Review of Systems  Constitutional, HEENT, cardiovascular, pulmonary, gi and gu systems are negative, except as otherwise noted.    Objective    /74 (BP Location: Right arm, Patient Position: Sitting, Cuff Size: Adult Regular)   Pulse 89   Temp 98  F (36.7  C) (Temporal)   Ht 1.473 m (4' 10\")   Wt 64.4 kg (142 lb)   SpO2 98%   BMI 29.68 kg/m     Estimated body mass index is 29.68 kg/m  as calculated from the following:    Height as of this encounter: 1.473 m (4' 10\").    Weight as of this encounter: 64.4 kg (142 lb).  Physical Exam  GENERAL: alert and no distress  EYES: Eyes grossly normal to inspection, PERRL and conjunctivae and sclerae normal  HENT: ear canals and TM's normal, nose and mouth without ulcers or lesions  NECK: no adenopathy, no asymmetry, masses, or scars  RESP: lungs clear to auscultation - no rales, rhonchi or wheezes  CV: regular rate and rhythm, normal S1 S2, no S3 or S4, no murmur, click or rub, no peripheral edema  ABDOMEN: soft, nontender, no hepatosplenomegaly, no masses and bowel sounds normal  MS: no gross musculoskeletal defects noted, no edema  SKIN: no suspicious lesions or rashes  NEURO: Normal strength and tone, mentation intact and speech normal  PSYCH: mentation appears normal, affect normal/bright    Recent Labs   Lab Test 09/04/24  0727 08/23/24  0000 08/19/24  0000 05/06/24  0000 12/13/23  1048 12/13/23  0000   HGB  --   --  13.9  --  14.2  --    PLT  --   --  314  --  342  --      --   --   --   --  135.4   POTASSIUM 4.2  --   --   --   --  4.68   CR 1.17*  --   --   --   --  1.01   A1C  --  6.1*  --  6.2*  --   --         Diagnostics  Recent Results (from the past 720 hour(s))   URINALYSIS, ROUTINE (P)    Collection Time: 08/19/24 12:00 AM   Result Value Ref Range    Color Urine Yellow     Appearance Urine Clear     Glucose Urine Neg mg/dL    Bilirubin Urine Neg     Ketones Urine Neg mg/dL    Specific Gravity Urine 1.015     Blood Urine Large (A)     pH " Urine 7.0 pH    Protein Urine neg neg - neg mg/dL    Urobilinogen Urine 0.2 EU/dL    Nitrite Urine Neg     Leukocytes neg     Wbc, Urine Micro 2-3 (A) neg - 2    RBC Micro Urine 5-10 (A) neg - 2    EP/HPF neg     Bacteria Urine few (A) neg - neg    Casts/LPF neg     Miscellaneous neg    HEMOGRAM PLATELET DIFF (BFP)    Collection Time: 08/19/24 12:00 AM   Result Value Ref Range    WBC 8.9 4.0 - 11 10*9/L    RBC Count 4.37 3.8 - 5.2 10*12/L    Hemoglobin 13.9 11.7 - 15.7 g/dL    Hematocrit 42.6 35.0 - 47.0 %    MCV 97.4 78 - 100 fL    MCH 31.8 26 - 33 pg    MCHC 32.6 31 - 36 g/dL    Platelet Count 314 150 - 375 10^9/L    % Granulocytes 70.4 %    % Lymphocytes 21.6 %    % Monocytes 8.0 %   URINE CULTURE AEROBIC BACTERIAL ([facility])    Collection Time: 08/19/24 12:04 PM   Result Value Ref Range    Urine Voided Culture SEE NOTE    TSH WITH FREE T4 REFLEX ([facility])    Collection Time: 08/19/24  1:02 PM   Result Value Ref Range    TSH 2.75 0.40 - 4.50 mIU/L   HEMOGLOBIN A1C (BFP)    Collection Time: 08/23/24 12:00 AM   Result Value Ref Range    Hemoglobin A1C 6.1 (A) 4 - 5.6 %   Basic metabolic panel    Collection Time: 09/04/24  7:27 AM   Result Value Ref Range    Sodium 140 135 - 145 mmol/L    Potassium 4.2 3.4 - 5.3 mmol/L    Chloride 103 98 - 107 mmol/L    Carbon Dioxide (CO2) 29 22 - 29 mmol/L    Anion Gap 8 7 - 15 mmol/L    Urea Nitrogen 17.8 8.0 - 23.0 mg/dL    Creatinine 1.17 (H) 0.51 - 0.95 mg/dL    GFR Estimate 50 (L) >60 mL/min/1.73m2    Calcium 9.7 8.8 - 10.4 mg/dL    Glucose 134 (H) 70 - 99 mg/dL   Lipid Profile    Collection Time: 09/04/24  7:27 AM   Result Value Ref Range    Cholesterol 122 <200 mg/dL    Triglycerides 154 (H) <150 mg/dL    Direct Measure HDL 44 (L) >=50 mg/dL    LDL Cholesterol Calculated 47 <=100 mg/dL    Non HDL Cholesterol 78 <130 mg/dL    Patient Fasting > 8hrs? Yes    ALT    Collection Time: 09/04/24  7:27 AM   Result Value Ref Range    ALT 20 0 - 50 U/L   HEMOGRAM PLATELET DIFF (BFP)     Collection Time: 09/12/24 12:00 AM   Result Value Ref Range    WBC 8.6 4.0 - 11 10*9/L    RBC Count 4.44 3.8 - 5.2 10*12/L    Hemoglobin 14.3 11.7 - 15.7 g/dL    Hematocrit 43.0 35.0 - 47.0 %    MCV 96.9 78 - 100 fL    MCH 32.2 26 - 33 pg    MCHC 33.3 31 - 36 g/dL    Platelet Count 217 150 - 375 10^9/L    % Granulocytes 81.5 %    % Lymphocytes 13.7 %    % Monocytes 4.8 %          Revised Cardiac Risk Index (RCRI)  The patient has the following serious cardiovascular risks for perioperative complications:   - Coronary Artery Disease (MI, positive stress test, angina, Qs on EKG) = 1 point     RCRI Interpretation: 1 point: Class II (low risk - 0.9% complication rate)         Signed Electronically by: Dora Marie MD  A copy of this evaluation report is provided to the requesting physician.

## 2024-09-12 ENCOUNTER — OFFICE VISIT (OUTPATIENT)
Dept: FAMILY MEDICINE | Facility: CLINIC | Age: 69
End: 2024-09-12

## 2024-09-12 VITALS
WEIGHT: 142 LBS | DIASTOLIC BLOOD PRESSURE: 74 MMHG | TEMPERATURE: 98 F | SYSTOLIC BLOOD PRESSURE: 128 MMHG | BODY MASS INDEX: 29.81 KG/M2 | HEIGHT: 58 IN | OXYGEN SATURATION: 98 % | HEART RATE: 89 BPM

## 2024-09-12 DIAGNOSIS — F32.89 OTHER DEPRESSION: ICD-10-CM

## 2024-09-12 DIAGNOSIS — N28.9 RENAL INSUFFICIENCY: ICD-10-CM

## 2024-09-12 DIAGNOSIS — R06.83 SNORES: ICD-10-CM

## 2024-09-12 DIAGNOSIS — I10 ESSENTIAL HYPERTENSION: ICD-10-CM

## 2024-09-12 DIAGNOSIS — Z95.828 HISTORY OF COMMON CAROTID ARTERY STENT PLACEMENT: ICD-10-CM

## 2024-09-12 DIAGNOSIS — R73.03 BORDERLINE DIABETES: ICD-10-CM

## 2024-09-12 DIAGNOSIS — N93.9 ABNORMAL VAGINAL BLEEDING: ICD-10-CM

## 2024-09-12 DIAGNOSIS — I25.10 CORONARY ARTERY DISEASE INVOLVING NATIVE CORONARY ARTERY OF NATIVE HEART WITHOUT ANGINA PECTORIS: ICD-10-CM

## 2024-09-12 DIAGNOSIS — G43.809 OTHER MIGRAINE WITHOUT STATUS MIGRAINOSUS, NOT INTRACTABLE: ICD-10-CM

## 2024-09-12 DIAGNOSIS — Z98.890 HISTORY OF COMMON CAROTID ARTERY STENT PLACEMENT: ICD-10-CM

## 2024-09-12 DIAGNOSIS — Z72.0 TOBACCO ABUSE: ICD-10-CM

## 2024-09-12 DIAGNOSIS — I65.23 CAROTID STENOSIS, BILATERAL: ICD-10-CM

## 2024-09-12 DIAGNOSIS — F41.9 ANXIETY: ICD-10-CM

## 2024-09-12 DIAGNOSIS — Z01.818 PREOPERATIVE EXAMINATION: Primary | ICD-10-CM

## 2024-09-12 DIAGNOSIS — E78.2 MIXED HYPERLIPIDEMIA: ICD-10-CM

## 2024-09-12 DIAGNOSIS — Z95.5 H/O HEART ARTERY STENT: ICD-10-CM

## 2024-09-12 PROBLEM — R73.01 IMPAIRED FASTING GLUCOSE: Status: RESOLVED | Noted: 2024-03-06 | Resolved: 2024-09-12

## 2024-09-12 LAB
% GRANULOCYTES: 81.5 %
HCT VFR BLD AUTO: 43 % (ref 35–47)
HEMOGLOBIN: 14.3 G/DL (ref 11.7–15.7)
LYMPHOCYTES NFR BLD AUTO: 13.7 %
MCH RBC QN AUTO: 32.2 PG (ref 26–33)
MCHC RBC AUTO-ENTMCNC: 33.3 G/DL (ref 31–36)
MCV RBC AUTO: 96.9 FL (ref 78–100)
MONOCYTES NFR BLD AUTO: 4.8 %
PLATELET COUNT - QUEST: 217 10^9/L (ref 150–375)
RBC # BLD AUTO: 4.44 10*12/L (ref 3.8–5.2)
WBC # BLD AUTO: 8.6 10*9/L (ref 4–11)

## 2024-09-12 PROCEDURE — 99213 OFFICE O/P EST LOW 20 MIN: CPT | Performed by: FAMILY MEDICINE

## 2024-09-12 PROCEDURE — 36415 COLL VENOUS BLD VENIPUNCTURE: CPT | Performed by: FAMILY MEDICINE

## 2024-09-12 PROCEDURE — 85025 COMPLETE CBC W/AUTO DIFF WBC: CPT | Performed by: FAMILY MEDICINE

## 2024-09-12 NOTE — LETTER
9/12/2024      Eliza Saenz  [address] [facility] [address]        Preoperative Evaluation  [facility]  [address]  [address]  [facility] [address]  Phone: [phone]  Fax: [phone]  Primary Provider: Dora Marie MD  Pre-op Performing Provider: Dora Marie MD  Sep 12, 2024               9/12/2024   Surgical Information   What procedure is being done? Hysteroscopy Dilation and Curettage   Facility or [facility] where procedure/surgery will be performed: [facility]   Who is doing the procedure / surgery? Dr. Beck   Date of surgery / procedure: 09/17/24   Time of surgery / procedure: AM   Where do you plan to recover after surgery? at home with family      Fax number for surgical facility: Note does not need to be faxed, will be available electronically in Epic.    Assessment & Plan    The proposed surgical procedure is considered LOW risk.    Problem List Items Addressed This Visit       Tobacco abuse    Mixed hyperlipidemia--managed by cardiology    Migraine headache    Essential hypertension    Depression    Coronary artery disease involving native coronary artery of native heart without angina pectoris    Carotid stenosis, bilateral    Anxiety    Borderline diabetes    Snores    History of common carotid artery stent placement    H/O heart artery stent    Renal insufficiency     Other Visit Diagnoses       Preoperative examination    -  Primary    Relevant Orders    VENOUS COLLECTION (Completed)    HEMOGRAM PLATELET DIFF (BFP) (Completed)    Abnormal vaginal bleeding                  1. Preoperative examination    - VENOUS COLLECTION  - HEMOGRAM PLATELET DIFF (BFP)    2. Abnormal vaginal bleeding      3. Coronary artery disease involving native coronary artery of native heart without angina pectoris  Recently has seen her cardiologist, no current symptoms, stable.     4. Essential hypertension  Controlled.    5. Borderline  diabetes  Controlled.    6. Mixed hyperlipidemia--managed by cardiology      7. Tobacco abuse  Is still smoking.    8. Other depression  Controlled on medications.    9. Anxiety      10. Other migraine without status migrainosus, not intractable      11. Carotid stenosis, bilateral      12. Snores      13. History of common carotid artery stent placement      14. H/O heart artery stent      15. Renal insufficiency        - No identified additional risk factors other than previously addressed    Antiplatelet or Anticoagulation Medication Instructions   - clopidrogel (Plavix), prasugrel (Effient), ticagrelor (Brilinta): No contraindication to stopping Plavix, DO NOT TAKE 5-7 days before surgery.     Additional Medication Instructions  Take all scheduled medications on the day of surgery    Recommendation  Approval given to proceed with proposed procedure, without further diagnostic evaluation.    Wes Kay is a 69 year old, presenting for the following:  Pre-Op Exam        HPI related to upcoming procedure: here with sister.  Will be doing endometrial biopsy and hysteroscopy for abnormal postmenopausal vaginal bleeding.  Has had the bleeding several times since June of this year.  Every once in awhile has some cramping.           9/12/2024   Pre-Op Questionnaire   Have you ever had a heart attack or stroke? No   Have you ever had surgery on your heart or blood vessels, such as a stent placement, a coronary artery bypass, or surgery on an artery in your head, neck, heart, or legs? (!) YES has 4 stents in heart circumflex in 2017 or 2019, also stents in carotid arteries   Do you have chest pain with activity? No   Do you have a history of heart failure? No   Do you currently have a cold, bronchitis or symptoms of other infection? No   Do you have a cough, shortness of breath, or wheezing? No   Do you or anyone in your family have previous history of blood clots? No   Do you or does anyone in your family have a  serious bleeding problem such as prolonged bleeding following surgeries or cuts? No   Have you ever had problems with anemia or been told to take iron pills? No   Have you had any abnormal blood loss such as black, tarry or bloody stools, or abnormal vaginal bleeding? No   Have you ever had a blood transfusion? No   Are you willing to have a blood transfusion if it is medically needed before, during, or after your surgery? Yes   Have you or any of your relatives ever had problems with anesthesia? No   Do you have sleep apnea, excessive snoring or daytime drowsiness? (!) YES snores   Do you have a CPAP machine? (!) NO    Do you have any artifical heart valves or other implanted medical devices like a pacemaker, defibrillator, or continuous glucose monitor? No   Do you have artificial joints? No   Are you allergic to latex? No      Health Care Directive  Patient does not have a Health Care Directive or Living Will: Discussed advance care planning with patient; information given to patient to review.    Preoperative Review of    reviewed - narcotics 09/23 and 12/23 after surgery, not currently taking      Status of Chronic Conditions:  CAD - Patient has a longstanding history of moderate-severe CAD. Patient denies recent chest pain or NTG use, denies exercise induced dyspnea or PND.  DEPRESSION - Patient has a long history of Depression of moderate severity requiring medication for control with recent symptoms being stable..Current symptoms of depression include none.     HYPERLIPIDEMIA - Patient has a long history of significant Hyperlipidemia requiring medication for treatment with recent good control. Patient reports no problems or side effects with the medication.     HYPERTENSION - Patient has longstanding history of HTN , currently denies any symptoms referable to elevated blood pressure. Specifically denies chest pain, palpitations, dyspnea, orthopnea, PND or peripheral edema. Blood pressure readings have  been in normal range. Current medication regimen is as listed below. Patient denies any side effects of medication.     Patient Active Problem List    Diagnosis Date Noted    Snores 2024     Priority: Medium    History of common carotid artery stent placement 2024     Priority: Medium    H/O heart artery stent 2024     Priority: Medium    Renal insufficiency 2024     Priority: Medium    Borderline diabetes 2024     Priority: Medium    Atrophy of vagina 2022     Priority: Medium    Palpitations 05/10/2021     Priority: Medium    SAEED (dyspnea on exertion) 05/10/2021     Priority: Medium    Coronary artery disease involving native coronary artery of native heart without angina pectoris 05/10/2021     Priority: Medium     S/P stenting of Circ  May 21st, 2019      Abnormal cardiovascular stress test 05/10/2021     Priority: Medium    ACP (advance care planning) 05/10/2021     Priority: Medium    Anxiety 05/10/2021     Priority: Medium    Research study patient 2019     Priority: Medium     A post-approval study of the Medtronic Resolute TALISHA Zotoroliums-Eluting Coronary Stent System  To assess the continued safety and efficacy of the Resolute Alpha stent for the treatment of lesions in the coronary arteries amenable to treatment with a Resolute Talisha 2.0mm-5.0mm. If hospitalized for cardiac reasons please contact     -  Daniela Copeland phone: [phone]      Essential hypertension 2016     Priority: Medium    Tobacco abuse 2016     Priority: Medium    Mixed hyperlipidemia--managed by cardiology 2016     Priority: Medium    Migraine headache 2016     Priority: Medium    Depression 2016     Priority: Medium    Carotid stenosis, bilateral 2016     Priority: Medium      No past medical history on file.  Past Surgical History:   Procedure Laterality Date    CAROTID ENDARTERECTOMY Bilateral      SECTION       FOOT SURGERY Left     HEART CATH STENT COR W/WO PTCA  2019    SHOULDER SURGERY Right     WRIST SURGERY Left      Current Outpatient Medications   Medication Sig Dispense Refill    atorvastatin (LIPITOR) 80 MG tablet Take 1 tablet (80 mg) by mouth daily. Filled by Cardiology 90 tablet 3    Calcium Carbonate (CALCIUM 600 PO) Take by mouth daily      clobetasol (TEMOVATE) 0.05 % external ointment Apply topically three times a week      clopidogrel (PLAVIX) 75 MG tablet Take 1 tablet (75 mg) by mouth daily. Filled by Cardiology 90 tablet 3    ezetimibe (ZETIA) 10 MG tablet Take 1 tablet (10 mg) by mouth daily. Filled by Cardiology 90 tablet 3    metoprolol succinate ER (TOPROL XL) 25 MG 24 hr tablet Take 1 tablet (25 mg) by mouth daily. 90 tablet 3    nitroGLYcerin (NITROSTAT) 0.4 MG sublingual tablet Place 1 tablet (0.4 mg) under the tongue every 5 minutes as needed for chest pain. For chest pain place 1 tablet under the tongue every 5 minutes for 3 doses. If symptoms persist 5 minutes after 1st dose call 911. 25 tablet 1    triamcinolone (KENALOG) 0.1 % external cream Apply topically 2 times daily.      venlafaxine (EFFEXOR XR) 75 MG 24 hr capsule Take 1 capsule (75 mg) by mouth daily. 90 capsule 1    vitamin D3 (CHOLECALCIFEROL) 50 mcg (2000 units) tablet Take 1 tablet by mouth daily         Allergies   Allergen Reactions    Penicillins Hives    Sulfa Antibiotics Hives    Tobramycin Rash        Social History     Tobacco Use    Smoking status: Every Day     Current packs/day: 1.00     Average packs/day: 1 pack/day for 50.7 years (50.7 ttl pk-yrs)     Types: Cigarettes     Start date: 1974     Passive exposure: Current    Smokeless tobacco: Never   Substance Use Topics    Alcohol use: Never     Family History   Problem Relation Age of Onset    Heart Disease Mother     Depression Mother     Hypertension Mother     Alcoholism Father     Heart Disease Father     Skin Cancer Sister     Heart Disease Brother     Coronary  "Artery Disease Brother     Cerebrovascular Disease Brother      History   Drug Use Unknown             Review of Systems  Constitutional, HEENT, cardiovascular, pulmonary, gi and gu systems are negative, except as otherwise noted.    Objective   /74 (BP Location: Right arm, Patient Position: Sitting, Cuff Size: Adult Regular)   Pulse 89   Temp 98  F (36.7  C) (Temporal)   Ht 1.473 m (4' 10\")   Wt 64.4 kg (142 lb)   SpO2 98%   BMI 29.68 kg/m     Estimated body mass index is 29.68 kg/m  as calculated from the following:    Height as of this encounter: 1.473 m (4' 10\").    Weight as of this encounter: 64.4 kg (142 lb).  Physical Exam  GENERAL: alert and no distress  EYES: Eyes grossly normal to inspection, PERRL and conjunctivae and sclerae normal  HENT: ear canals and TM's normal, nose and mouth without ulcers or lesions  NECK: no adenopathy, no asymmetry, masses, or scars  RESP: lungs clear to auscultation - no rales, rhonchi or wheezes  CV: regular rate and rhythm, normal S1 S2, no S3 or S4, no murmur, click or rub, no peripheral edema  ABDOMEN: soft, nontender, no hepatosplenomegaly, no masses and bowel sounds normal  MS: no gross musculoskeletal defects noted, no edema  SKIN: no suspicious lesions or rashes  NEURO: Normal strength and tone, mentation intact and speech normal  PSYCH: mentation appears normal, affect normal/bright    Recent Labs   Lab Test 09/04/24  0727 08/23/24  0000 08/19/24  0000 05/06/24  0000 12/13/23  1048 12/13/23  0000   HGB  --   --  13.9  --  14.2  --    PLT  --   --  314  --  342  --      --   --   --   --  135.4   POTASSIUM 4.2  --   --   --   --  4.68   CR 1.17*  --   --   --   --  1.01   A1C  --  6.1*  --  6.2*  --   --         Diagnostics  Recent Results (from the past 720 hour(s))   URINALYSIS, ROUTINE (BFP)    Collection Time: 08/19/24 12:00 AM   Result Value Ref Range    Color Urine Yellow     Appearance Urine Clear     Glucose Urine Neg mg/dL    Bilirubin Urine " Neg     Ketones Urine Neg mg/dL    Specific Gravity Urine 1.015     Blood Urine Large (A)     pH Urine 7.0 pH    Protein Urine neg neg - neg mg/dL    Urobilinogen Urine 0.2 EU/dL    Nitrite Urine Neg     Leukocytes neg     Wbc, Urine Micro 2-3 (A) neg - 2    RBC Micro Urine 5-10 (A) neg - 2    EP/HPF neg     Bacteria Urine few (A) neg - neg    Casts/LPF neg     Miscellaneous neg    HEMOGRAM PLATELET DIFF (BFP)    Collection Time: 08/19/24 12:00 AM   Result Value Ref Range    WBC 8.9 4.0 - 11 10*9/L    RBC Count 4.37 3.8 - 5.2 10*12/L    Hemoglobin 13.9 11.7 - 15.7 g/dL    Hematocrit 42.6 35.0 - 47.0 %    MCV 97.4 78 - 100 fL    MCH 31.8 26 - 33 pg    MCHC 32.6 31 - 36 g/dL    Platelet Count 314 150 - 375 10^9/L    % Granulocytes 70.4 %    % Lymphocytes 21.6 %    % Monocytes 8.0 %   URINE CULTURE AEROBIC BACTERIAL ([facility])    Collection Time: 08/19/24 12:04 PM   Result Value Ref Range    Urine Voided Culture SEE NOTE    TSH WITH FREE T4 REFLEX ([facility])    Collection Time: 08/19/24  1:02 PM   Result Value Ref Range    TSH 2.75 0.40 - 4.50 mIU/L   HEMOGLOBIN A1C (BFP)    Collection Time: 08/23/24 12:00 AM   Result Value Ref Range    Hemoglobin A1C 6.1 (A) 4 - 5.6 %   Basic metabolic panel    Collection Time: 09/04/24  7:27 AM   Result Value Ref Range    Sodium 140 135 - 145 mmol/L    Potassium 4.2 3.4 - 5.3 mmol/L    Chloride 103 98 - 107 mmol/L    Carbon Dioxide (CO2) 29 22 - 29 mmol/L    Anion Gap 8 7 - 15 mmol/L    Urea Nitrogen 17.8 8.0 - 23.0 mg/dL    Creatinine 1.17 (H) 0.51 - 0.95 mg/dL    GFR Estimate 50 (L) >60 mL/min/1.73m2    Calcium 9.7 8.8 - 10.4 mg/dL    Glucose 134 (H) 70 - 99 mg/dL   Lipid Profile    Collection Time: 09/04/24  7:27 AM   Result Value Ref Range    Cholesterol 122 <200 mg/dL    Triglycerides 154 (H) <150 mg/dL    Direct Measure HDL 44 (L) >=50 mg/dL    LDL Cholesterol Calculated 47 <=100 mg/dL    Non HDL Cholesterol 78 <130 mg/dL    Patient Fasting > 8hrs? Yes    ALT    Collection Time:  09/04/24  7:27 AM   Result Value Ref Range    ALT 20 0 - 50 U/L   HEMOGRAM PLATELET DIFF (BFP)    Collection Time: 09/12/24 12:00 AM   Result Value Ref Range    WBC 8.6 4.0 - 11 10*9/L    RBC Count 4.44 3.8 - 5.2 10*12/L    Hemoglobin 14.3 11.7 - 15.7 g/dL    Hematocrit 43.0 35.0 - 47.0 %    MCV 96.9 78 - 100 fL    MCH 32.2 26 - 33 pg    MCHC 33.3 31 - 36 g/dL    Platelet Count 217 150 - 375 10^9/L    % Granulocytes 81.5 %    % Lymphocytes 13.7 %    % Monocytes 4.8 %          Revised Cardiac Risk Index (RCRI)  The patient has the following serious cardiovascular risks for perioperative complications:   - Coronary Artery Disease (MI, positive stress test, angina, Qs on EKG) = 1 point     RCRI Interpretation: 1 point: Class II (low risk - 0.9% complication rate)         Signed Electronically by: Dora Marie MD  A copy of this evaluation report is provided to the requesting physician.             Sincerely,        Dora Marie MD

## 2024-09-13 ENCOUNTER — TELEPHONE (OUTPATIENT)
Dept: FAMILY MEDICINE | Facility: CLINIC | Age: 69
End: 2024-09-13

## 2024-09-13 NOTE — TELEPHONE ENCOUNTER
Lola Middleton on 2024           [facility] [place]        [address]  [address]LO[address]  [facility] [address]  Phone: [phone]  Fax: [phone]      2024  Re: Eliza Saenz  YOB: 1955     Dear Colleague,     Thank you for your referral to the [facility]. We have been unable to schedule the referral after several contact attempts.       If you have any questions or concerns, please contact our office at Dept: [phone].           Sincerely,  [facility]              The [facility] represents a collaboration between [facility] and [facility].        RE: Eliza Saenz    MRN: [id number]   : 1955   ENC DATE: Sep 5, 2024   PAGE:       Please notify her to schedule.

## 2024-09-17 ENCOUNTER — ANESTHESIA EVENT (OUTPATIENT)
Dept: SURGERY | Facility: HOSPITAL | Age: 69
End: 2024-09-17
Payer: COMMERCIAL

## 2024-09-17 ENCOUNTER — HOSPITAL ENCOUNTER (OUTPATIENT)
Facility: HOSPITAL | Age: 69
Discharge: HOME | End: 2024-09-17
Attending: OBSTETRICS & GYNECOLOGY | Admitting: OBSTETRICS & GYNECOLOGY
Payer: COMMERCIAL

## 2024-09-17 ENCOUNTER — ANESTHESIA (OUTPATIENT)
Dept: SURGERY | Facility: HOSPITAL | Age: 69
End: 2024-09-17
Payer: COMMERCIAL

## 2024-09-17 VITALS
HEIGHT: 58 IN | WEIGHT: 138.4 LBS | DIASTOLIC BLOOD PRESSURE: 60 MMHG | SYSTOLIC BLOOD PRESSURE: 126 MMHG | TEMPERATURE: 96.6 F | OXYGEN SATURATION: 95 % | RESPIRATION RATE: 15 BRPM | BODY MASS INDEX: 29.05 KG/M2 | HEART RATE: 76 BPM

## 2024-09-17 PROCEDURE — 250N000009 HC RX 250: Performed by: OBSTETRICS & GYNECOLOGY

## 2024-09-17 PROCEDURE — 370N000017 HC ANESTHESIA TECHNICAL FEE, PER MIN: Performed by: OBSTETRICS & GYNECOLOGY

## 2024-09-17 PROCEDURE — 272N000001 HC OR GENERAL SUPPLY STERILE: Performed by: OBSTETRICS & GYNECOLOGY

## 2024-09-17 PROCEDURE — 250N000009 HC RX 250: Performed by: ANESTHESIOLOGY

## 2024-09-17 PROCEDURE — 258N000001 HC RX 258: Performed by: OBSTETRICS & GYNECOLOGY

## 2024-09-17 PROCEDURE — 710N000012 HC RECOVERY PHASE 2, PER MINUTE: Performed by: OBSTETRICS & GYNECOLOGY

## 2024-09-17 PROCEDURE — 58558 HYSTEROSCOPY BIOPSY: CPT | Performed by: ANESTHESIOLOGY

## 2024-09-17 PROCEDURE — 258N000003 HC RX IP 258 OP 636: Performed by: ANESTHESIOLOGY

## 2024-09-17 PROCEDURE — 250N000011 HC RX IP 250 OP 636: Performed by: ANESTHESIOLOGY

## 2024-09-17 PROCEDURE — 88305 TISSUE EXAM BY PATHOLOGIST: CPT | Mod: TC | Performed by: OBSTETRICS & GYNECOLOGY

## 2024-09-17 PROCEDURE — 250N000013 HC RX MED GY IP 250 OP 250 PS 637: Performed by: OBSTETRICS & GYNECOLOGY

## 2024-09-17 PROCEDURE — 58558 HYSTEROSCOPY BIOPSY: CPT | Performed by: NURSE ANESTHETIST, CERTIFIED REGISTERED

## 2024-09-17 PROCEDURE — 360N000076 HC SURGERY LEVEL 3, PER MIN: Performed by: OBSTETRICS & GYNECOLOGY

## 2024-09-17 PROCEDURE — 999N000141 HC STATISTIC PRE-PROCEDURE NURSING ASSESSMENT: Performed by: OBSTETRICS & GYNECOLOGY

## 2024-09-17 RX ORDER — OXYCODONE HYDROCHLORIDE 5 MG/1
10 TABLET ORAL
Status: DISCONTINUED | OUTPATIENT
Start: 2024-09-17 | End: 2024-09-17 | Stop reason: HOSPADM

## 2024-09-17 RX ORDER — NALOXONE HYDROCHLORIDE 0.4 MG/ML
0.1 INJECTION, SOLUTION INTRAMUSCULAR; INTRAVENOUS; SUBCUTANEOUS
Status: DISCONTINUED | OUTPATIENT
Start: 2024-09-17 | End: 2024-09-17 | Stop reason: HOSPADM

## 2024-09-17 RX ORDER — LIDOCAINE HYDROCHLORIDE 10 MG/ML
INJECTION, SOLUTION INFILTRATION; PERINEURAL PRN
Status: DISCONTINUED | OUTPATIENT
Start: 2024-09-17 | End: 2024-09-17

## 2024-09-17 RX ORDER — FENTANYL CITRATE 50 UG/ML
50 INJECTION, SOLUTION INTRAMUSCULAR; INTRAVENOUS EVERY 5 MIN PRN
Status: DISCONTINUED | OUTPATIENT
Start: 2024-09-17 | End: 2024-09-17

## 2024-09-17 RX ORDER — OXYCODONE HYDROCHLORIDE 5 MG/1
5 TABLET ORAL
Status: DISCONTINUED | OUTPATIENT
Start: 2024-09-17 | End: 2024-09-17 | Stop reason: HOSPADM

## 2024-09-17 RX ORDER — ACETAMINOPHEN 325 MG/1
975 TABLET ORAL ONCE
Status: DISCONTINUED | OUTPATIENT
Start: 2024-09-17 | End: 2024-09-17

## 2024-09-17 RX ORDER — ACETAMINOPHEN 325 MG/1
975 TABLET ORAL ONCE
Status: COMPLETED | OUTPATIENT
Start: 2024-09-17 | End: 2024-09-17

## 2024-09-17 RX ORDER — DEXAMETHASONE SODIUM PHOSPHATE 10 MG/ML
4 INJECTION, SOLUTION INTRAMUSCULAR; INTRAVENOUS
Status: DISCONTINUED | OUTPATIENT
Start: 2024-09-17 | End: 2024-09-17 | Stop reason: HOSPADM

## 2024-09-17 RX ORDER — FENTANYL CITRATE 50 UG/ML
25 INJECTION, SOLUTION INTRAMUSCULAR; INTRAVENOUS EVERY 5 MIN PRN
Status: DISCONTINUED | OUTPATIENT
Start: 2024-09-17 | End: 2024-09-17 | Stop reason: HOSPADM

## 2024-09-17 RX ORDER — LIDOCAINE 40 MG/G
CREAM TOPICAL
Status: DISCONTINUED | OUTPATIENT
Start: 2024-09-17 | End: 2024-09-17 | Stop reason: HOSPADM

## 2024-09-17 RX ORDER — PROPOFOL 10 MG/ML
INJECTION, EMULSION INTRAVENOUS PRN
Status: DISCONTINUED | OUTPATIENT
Start: 2024-09-17 | End: 2024-09-17

## 2024-09-17 RX ORDER — HYDROMORPHONE HCL IN WATER/PF 6 MG/30 ML
0.2 PATIENT CONTROLLED ANALGESIA SYRINGE INTRAVENOUS EVERY 5 MIN PRN
Status: DISCONTINUED | OUTPATIENT
Start: 2024-09-17 | End: 2024-09-17 | Stop reason: HOSPADM

## 2024-09-17 RX ORDER — ONDANSETRON 4 MG/1
4 TABLET, ORALLY DISINTEGRATING ORAL EVERY 30 MIN PRN
Status: DISCONTINUED | OUTPATIENT
Start: 2024-09-17 | End: 2024-09-17 | Stop reason: HOSPADM

## 2024-09-17 RX ORDER — HYDROMORPHONE HCL IN WATER/PF 6 MG/30 ML
0.4 PATIENT CONTROLLED ANALGESIA SYRINGE INTRAVENOUS EVERY 5 MIN PRN
Status: DISCONTINUED | OUTPATIENT
Start: 2024-09-17 | End: 2024-09-17 | Stop reason: HOSPADM

## 2024-09-17 RX ORDER — ONDANSETRON 2 MG/ML
INJECTION INTRAMUSCULAR; INTRAVENOUS PRN
Status: DISCONTINUED | OUTPATIENT
Start: 2024-09-17 | End: 2024-09-17

## 2024-09-17 RX ORDER — NALOXONE HYDROCHLORIDE 0.4 MG/ML
0.1 INJECTION, SOLUTION INTRAMUSCULAR; INTRAVENOUS; SUBCUTANEOUS
Status: DISCONTINUED | OUTPATIENT
Start: 2024-09-17 | End: 2024-09-17

## 2024-09-17 RX ORDER — ONDANSETRON 2 MG/ML
4 INJECTION INTRAMUSCULAR; INTRAVENOUS EVERY 30 MIN PRN
Status: DISCONTINUED | OUTPATIENT
Start: 2024-09-17 | End: 2024-09-17 | Stop reason: HOSPADM

## 2024-09-17 RX ORDER — ONDANSETRON 4 MG/1
4 TABLET, ORALLY DISINTEGRATING ORAL EVERY 30 MIN PRN
Status: DISCONTINUED | OUTPATIENT
Start: 2024-09-17 | End: 2024-09-17

## 2024-09-17 RX ORDER — PROPOFOL 10 MG/ML
INJECTION, EMULSION INTRAVENOUS CONTINUOUS PRN
Status: DISCONTINUED | OUTPATIENT
Start: 2024-09-17 | End: 2024-09-17

## 2024-09-17 RX ORDER — SODIUM CHLORIDE, SODIUM LACTATE, POTASSIUM CHLORIDE, CALCIUM CHLORIDE 600; 310; 30; 20 MG/100ML; MG/100ML; MG/100ML; MG/100ML
INJECTION, SOLUTION INTRAVENOUS CONTINUOUS
Status: DISCONTINUED | OUTPATIENT
Start: 2024-09-17 | End: 2024-09-17 | Stop reason: HOSPADM

## 2024-09-17 RX ORDER — DEXAMETHASONE SODIUM PHOSPHATE 10 MG/ML
4 INJECTION, SOLUTION INTRAMUSCULAR; INTRAVENOUS
Status: DISCONTINUED | OUTPATIENT
Start: 2024-09-17 | End: 2024-09-17

## 2024-09-17 RX ORDER — GLYCOPYRROLATE 0.2 MG/ML
INJECTION, SOLUTION INTRAMUSCULAR; INTRAVENOUS PRN
Status: DISCONTINUED | OUTPATIENT
Start: 2024-09-17 | End: 2024-09-17

## 2024-09-17 RX ORDER — FENTANYL CITRATE 50 UG/ML
INJECTION, SOLUTION INTRAMUSCULAR; INTRAVENOUS PRN
Status: DISCONTINUED | OUTPATIENT
Start: 2024-09-17 | End: 2024-09-17

## 2024-09-17 RX ORDER — ONDANSETRON 2 MG/ML
4 INJECTION INTRAMUSCULAR; INTRAVENOUS EVERY 30 MIN PRN
Status: DISCONTINUED | OUTPATIENT
Start: 2024-09-17 | End: 2024-09-17

## 2024-09-17 RX ADMIN — GLYCOPYRROLATE 0.4 MG: 0.2 INJECTION INTRAMUSCULAR; INTRAVENOUS at 10:16

## 2024-09-17 RX ADMIN — LIDOCAINE HYDROCHLORIDE 2 ML: 10 INJECTION, SOLUTION INFILTRATION; PERINEURAL at 09:58

## 2024-09-17 RX ADMIN — PROPOFOL 100 MCG/KG/MIN: 10 INJECTION, EMULSION INTRAVENOUS at 09:58

## 2024-09-17 RX ADMIN — PROPOFOL 30 MG: 10 INJECTION, EMULSION INTRAVENOUS at 09:58

## 2024-09-17 RX ADMIN — PROPOFOL 20 MG: 10 INJECTION, EMULSION INTRAVENOUS at 10:20

## 2024-09-17 RX ADMIN — SODIUM CHLORIDE, POTASSIUM CHLORIDE, SODIUM LACTATE AND CALCIUM CHLORIDE: 600; 310; 30; 20 INJECTION, SOLUTION INTRAVENOUS at 09:22

## 2024-09-17 RX ADMIN — FENTANYL CITRATE 25 MCG: 50 INJECTION INTRAMUSCULAR; INTRAVENOUS at 10:02

## 2024-09-17 RX ADMIN — ACETAMINOPHEN 975 MG: 325 TABLET ORAL at 09:22

## 2024-09-17 RX ADMIN — PHENYLEPHRINE HYDROCHLORIDE 100 MCG: 10 INJECTION INTRAVENOUS at 10:10

## 2024-09-17 RX ADMIN — FENTANYL CITRATE 25 MCG: 50 INJECTION INTRAMUSCULAR; INTRAVENOUS at 10:20

## 2024-09-17 RX ADMIN — ONDANSETRON 4 MG: 2 INJECTION INTRAMUSCULAR; INTRAVENOUS at 09:58

## 2024-09-17 ASSESSMENT — ACTIVITIES OF DAILY LIVING (ADL)
ADLS_ACUITY_SCORE: 35

## 2024-09-17 NOTE — OP NOTE
OPERATIVE NOTE   Patient: Eliza Saenz  MRN: [id number]  CSN: [id number]    Procedure date: 9/17/2024    Preoperative Diagnosis:  1. PMB  2. Thickened endometrium     Postoperative Diagnosis: Same, atrophic endometrium     Procedure(s): Procedure(s):  Hysteroscopy Dilation and Curettage  Attending Surgeon: Cindy Beck MD  Assistant(s): Circulator: Mary Ann Fernandez RN; Anton Vásquez RN  Scrub Person: Israel Crawford; Poly Henry  Anesthesia: MAC  EBL: 5mL  Fluids: see anesthesia record   Fluid deficit: 45ml  Complications: none    Description of findings:  EUA revealed small introitus with atrophic cervix, difficult to grasp anterior lip of cervix. Small speculum required.   Hysteroscopy demonstrated normal ostia bilaterally, normal appearing endometrium with atrophic appearance     Specimens submitted:  ID Type Source Tests Collected by Time Destination   1 : ENDOMETRIAL CURRETINGS Curettings Endometrium SURGICAL PATHOLOGY EXAM Cindy Beck MD 9/17/2024 10:21 AM        PROCEDURE     The patient was explained the procedure. All risks, benefits, alternatives were discussed. The consent form was signed with a witness present.    The patient was taken to the OR where SCDs were placed and turned on. MAC was induced without difficulty. The patient was then placed in dorsal lithotomy position using stirrups. She was examined for the above noted findings. Then, the patient was prepped and draped in usual sterile fashion.    The cervix was visualized using the back of a pickups and a right angle retractor. A Single toothed tenaculum was placed on the anterior lip of the cervix. The cervix was dilated to accomodate an operative hysteroscope sequentially using Darnell dilators. An operative hysteroscope was carefully introduced through the cervix to the uterus under direct visualization. Sterile saline was used a medium for visualization of the uterine cavity. Both ostia were visualized and appeared  normal. Atrophic endometrium was visualized. A sharp curettage was performed with scant tissue able to be removed. No bleeding was noted from the surgical site or other areas of the uterine cavity. The hysteroscope was then removed from the uterus. Next the tenaculum was removed from the cervix and puncture sites examined. All instruments were then removed from the vagina.    Ins and outs were noted. All needle, instrument, and lap sponge count correct x 2. The procedure was overall without complication. The patient was repositioned to dorsal supine position, extubated without event and taken to the recovery room in stable condition.    DO [facility]

## 2024-09-17 NOTE — ANESTHESIA CARE TRANSFER NOTE
Patient: Eliza Saenz    Procedure: Procedure(s):  Hysteroscopy Dilation and Curettage       Diagnosis: Postmenopausal bleeding [N95.0]  Diagnosis Additional Information: No value filed.    Anesthesia Type:   MAC     Note:    Oropharynx: oropharynx clear of all foreign objects  Level of Consciousness: awake  Oxygen Supplementation: room air    Independent Airway: airway patency satisfactory and stable  Dentition: dentition unchanged  Vital Signs Stable: post-procedure vital signs reviewed and stable  Report to RN Given: handoff report given  Patient transferred to: Phase II    Handoff Report: Identifed the Patient, Identified the Reponsible Provider, Reviewed the pertinent medical history, Discussed the surgical course, Reviewed Intra-OP anesthesia mangement and issues during anesthesia, Set expectations for post-procedure period and Allowed opportunity for questions and acknowledgement of understanding      Vitals:  Vitals Value Taken Time   BP 88/53    Temp 35.9  C (96.62  F) 09/17/24 1037   Pulse 73 09/17/24 1037   Resp 13 09/17/24 1037   SpO2 93 % 09/17/24 1037   Vitals shown include unfiled device data.    Electronically Signed By: BANG Mercado CRNA  September 17, 2024  10:38 AM

## 2024-09-17 NOTE — H&P
History and Physical GYN    NAME:Eliza Saenz  : 1955  MRN: [id number]    CHIEF COMPLAINT: Postmenopausal bleeding [N95.0]  HPI:  Eliza Saenz is a 69 year old  female.  Here to discuss PMB.    Pt never had PMB until this year. Has had spotting on the following dates:  -, -, , 9/3 -    She is worried about doing a biopsy and what treatment would entail if the biopsy was positive    Ultrasound with Rayus:  Uterus 4.3 x 2.6 x 2.1, normal with no masses  Endometrium: 7mm thickened and heterogenous for age and menstrual status  R ovary and L ovary normal    PMH:  Past Medical History:   Diagnosis Date    Anxiety     Carotid stenosis, bilateral     Coronary artery disease involving native coronary artery of native heart without angina pectoris     Depression     Hyperlipidemia     Hypertension     Migraine headache     Renal insufficiency     Snores     Tobacco abuse        PSH:  Past Surgical History:   Procedure Laterality Date    CAROTID ENDARTERECTOMY Bilateral      SECTION      FOOT SURGERY Left     HEART CATH STENT COR W/WO PTCA  2019    History of common carotid artery stent placement      SHOULDER SURGERY Right     WRIST SURGERY Left        Social History:  Social History     Socioeconomic History    Marital status: Single     Spouse name: Not on file    Number of children: Not on file    Years of education: Not on file    Highest education level: Not on file   Occupational History    Not on file   Tobacco Use    Smoking status: Every Day     Current packs/day: 1.00     Average packs/day: 1 pack/day for 50.7 years (50.7 ttl pk-yrs)     Types: Cigarettes     Start date:      Passive exposure: Current    Smokeless tobacco: Never   Substance and Sexual Activity    Alcohol use: Never    Drug use: Never    Sexual activity: Not on file   Other Topics Concern    Not on file   Social History Narrative    Not on file     Social Determinants of Health     Financial Resource  Strain: Low Risk  (8/27/2023)    [facility]    Financial Resource Strain     Difficulty of Paying Living Expenses: 3     Difficulty of Paying Living Expenses: Not on file   Food Insecurity: No Food Insecurity (8/27/2023)    [facility]    Food Insecurity     Worried About Running Out of Food in the Last Year: 1   Transportation Needs: No Transportation Needs (8/27/2023)    [facility]    Transportation Needs     Lack of Transportation (Medical): 1   Physical Activity: Not on file   Stress: Not on file   Social Connections: Unknown (8/27/2024)    Received from [facility]    Social Connections     Frequency of Communication with Friends and Family: Not on file   Interpersonal Safety: Not on file   Housing Stability: Low Risk  (8/27/2023)    [facility]    Housing Stability     Unable to Pay for Housing in the Last Year: 1       Medications:  No current facility-administered medications for this encounter.     Current Outpatient Medications   Medication Sig Dispense Refill    atorvastatin (LIPITOR) 80 MG tablet Take 1 tablet (80 mg) by mouth daily. Filled by Cardiology 90 tablet 3    Calcium Carbonate (CALCIUM 600 PO) Take by mouth daily      clobetasol (TEMOVATE) 0.05 % external ointment Apply topically three times a week      clopidogrel (PLAVIX) 75 MG tablet Take 1 tablet (75 mg) by mouth daily. Filled by Cardiology 90 tablet 3    ezetimibe (ZETIA) 10 MG tablet Take 1 tablet (10 mg) by mouth daily. Filled by Cardiology 90 tablet 3    metoprolol succinate ER (TOPROL XL) 25 MG 24 hr tablet Take 1 tablet (25 mg) by  mouth daily. 90 tablet 3    nitroGLYcerin (NITROSTAT) 0.4 MG sublingual tablet Place 1 tablet (0.4 mg) under the tongue every 5 minutes as needed for chest pain. For chest pain place 1 tablet under the tongue every 5 minutes for 3 doses. If symptoms persist 5 minutes after 1st dose call 911. 25 tablet 1    triamcinolone (KENALOG) 0.1 % external cream Apply topically 2 times daily.      venlafaxine (EFFEXOR XR) 75 MG 24 hr capsule Take 1 capsule (75 mg) by mouth daily. 90 capsule 1    vitamin D3 (CHOLECALCIFEROL) 50 mcg (2000 units) tablet Take 1 tablet by mouth daily         Allergies:  Allergies   Allergen Reactions    Penicillins Hives    Sulfa Antibiotics Hives    Tobramycin Rash       Review of Systems   Negative except what is stated in the HPI    Physical exam:  There were no vitals taken for this visit.     General Appearance: Alert, appropriate appearance for age. No acute distress,   HEENT Exam: Grossly normal.  Chest/Respiratory Exam: Normal chest wall and respirations. Clear to auscultation.  Cardiovascular Exam: Regular rate and rhythm. S1, S2, no murmur, click, gallop, or rubs.,   Gastrointestinal ExamDeferred to OR  Psychiatric Exam: Alert and oriented, appropriate affect.      ASSESSMENT/PLAN:    1. Thickened endometrium   Notes: ES 7 mm on ultrasound    2. PMB (postmenopausal bleeding)   Notes: Discussed with PMB we need to rule out endometrial cancer, although the most common cause of bleeding after menopause is endometrial atrophy. We discussed that sampling can be done with an in-office EMB vs a D&C/H. She prefers to do a D&C/H due to pain tolerance.  She also expressed concerns about doing the biopsy rather than just doing a hysterectomy right away. This is an option for her, however I would need to refer her to a GYN Onc for this procedure because they would do frozen pathology intra-op to decide if lymph node biopsies were necessary, and she would likely need an MRI/CT before doing this  surgery. After learning this information she decided she would rather proceed with endometrial sampling first.  Discussed risk of bleeding, infection, damage to surrounding organs, or need for further surgery and she is accepting of these risks.  Will plan for D&C/H with biopsy. Patient does not have sleep apnea and she has a normal BMI, however does have 4 stents in her heart. Will need to check with anesthesia if they would prefer to have her sampling done at clinic vs the surgery center.    Will have patient return 2 weeks after the procedure for postop    [facility]

## 2024-09-17 NOTE — ANESTHESIA POSTPROCEDURE EVALUATION
Patient: Eliza Saenz    Procedure: Procedure(s):  Hysteroscopy Dilation and Curettage       Anesthesia Type:  MAC    Note:  Disposition: Outpatient   Postop Pain Control: Uneventful            Sign Out: Well controlled pain   PONV: No   Neuro/Psych: Uneventful            Sign Out: Acceptable/Baseline neuro status   Airway/Respiratory: Uneventful            Sign Out: Acceptable/Baseline resp. status   CV/Hemodynamics: Uneventful            Sign Out: Acceptable CV status; No obvious hypovolemia; No obvious fluid overload   Other NRE: NONE   DID A NON-ROUTINE EVENT OCCUR? No           Last vitals:  Vitals Value Taken Time   /60 09/17/24 1138   Temp 35.9  C (96.6  F) 09/17/24 1040   Pulse 76 09/17/24 1142   Resp 27 09/17/24 1127   SpO2 98 % 09/17/24 1142   Vitals shown include unfiled device data.    Electronically Signed By: Bakari Owens MD  September 17, 2024  2:07 PM

## 2024-09-17 NOTE — INTERVAL H&P NOTE
"I have reviewed the surgical (or preoperative) H&P that is linked to this encounter, and examined the patient. There are no significant changes    Clinical Conditions Present on Arrival:  Clinically Significant Risk Factors Present on Admission                 # Drug Induced Platelet Defect: home medication list includes an antiplatelet medication      # Overweight: Estimated body mass index is 28.93 kg/m  as calculated from the following:    Height as of this encounter: 1.473 m (4' 10\").    Weight as of this encounter: 62.8 kg (138 lb 6.4 oz).       "

## 2024-09-17 NOTE — ANESTHESIA PREPROCEDURE EVALUATION
Anesthesia Pre-Procedure Evaluation    Patient: Eliza Saenz   MRN: [id number] : 1955        Procedure : Procedure(s):  Hysteroscopy Dilation and Curettage          Past Medical History:   Diagnosis Date    Anxiety     Carotid stenosis, bilateral     Coronary artery disease involving native coronary artery of native heart without angina pectoris     Depression     Hyperlipidemia     Hypertension     Migraine headache     Renal insufficiency     Snores     Tobacco abuse       Past Surgical History:   Procedure Laterality Date    CAROTID ENDARTERECTOMY Bilateral      SECTION      FOOT SURGERY Left     HEART CATH STENT COR W/WO PTCA  2019    History of common carotid artery stent placement      SHOULDER SURGERY Right     WRIST SURGERY Left       Allergies   Allergen Reactions    Penicillins Hives    Sulfa Antibiotics Hives    Tobramycin Rash      Social History     Tobacco Use    Smoking status: Every Day     Current packs/day: 1.00     Average packs/day: 1 pack/day for 50.7 years (50.7 ttl pk-yrs)     Types: Cigarettes     Start date:      Passive exposure: Current    Smokeless tobacco: Never   Substance Use Topics    Alcohol use: Never      Wt Readings from Last 1 Encounters:   24 62.8 kg (138 lb 6.4 oz)        Anesthesia Evaluation   Pt has had prior anesthetic.         ROS/MED HX  ENT/Pulmonary:  - neg pulmonary ROS     Neurologic:  - neg neurologic ROS     Cardiovascular: Comment: Echo 22:  Final Impressions:  1. Normal left ventricular size, normal wall thickness, normal global systolic function, calculated EF of 66 %.  2. Right ventricular cavity size is normal, global systolic RV function is normal.  3. Mildly enlarged left atrium.  4. The aortic valve is trileaflet and sclerotic, no stenosis and no regurgitation.  5. Mildly increased estimated pulmonary pressures by tricuspid regurgitation velocity and right atrial pressure (35 mmHg plus RAP).       (+)  hypertension- Peripheral  "Vascular Disease-- Carotid Stenosis.  CAD -  - stent-                                      METS/Exercise Tolerance: >4 METS    Hematologic:  - neg hematologic  ROS     Musculoskeletal:  - neg musculoskeletal ROS     GI/Hepatic:  - neg GI/hepatic ROS     Renal/Genitourinary: Comment: menorrhagia    (+) renal disease, type: CRI,            Endo:  - neg endo ROS     Psychiatric/Substance Use:     (+) psychiatric history anxiety and depression       Infectious Disease:  - neg infectious disease ROS     Malignancy:  - neg malignancy ROS     Other:  - neg other ROS          Physical Exam    Airway        Mallampati: II   TM distance: > 3 FB   Neck ROM: full   Mouth opening: > 3 cm    Respiratory Devices and Support         Dental  no notable dental history     (+) Minor Abnormalities - some fillings, tiny chips      Cardiovascular   cardiovascular exam normal          Pulmonary   pulmonary exam normal            Other findings: Cr 1.17    OUTSIDE LABS:  CBC:   Lab Results   Component Value Date    WBC 8.6 09/12/2024    WBC 8.9 08/19/2024    HGB 14.3 09/12/2024    HGB 13.9 08/19/2024    HCT 43.0 09/12/2024    HCT 42.6 08/19/2024     09/12/2024     08/19/2024     BMP:   Lab Results   Component Value Date     09/04/2024    .4 12/13/2023    POTASSIUM 4.2 09/04/2024    POTASSIUM 4.68 12/13/2023    CHLORIDE 103 09/04/2024    CHLORIDE 99.3 12/13/2023    CO2 29 09/04/2024    CO2 31.3 12/13/2023    BUN 17.8 09/04/2024    BUN 18 12/13/2023    BUN 17.8 12/13/2023    CR 1.17 (H) 09/04/2024    CR 1.01 12/13/2023     (H) 09/04/2024     (A) 12/13/2023     COAGS: No results found for: \"PTT\", \"INR\", \"FIBR\"  POC: No results found for: \"BGM\", \"HCG\", \"HCGS\"  HEPATIC:   Lab Results   Component Value Date    ALBUMIN 4.3 05/14/2021    PROTTOTAL 6.5 05/14/2021    ALT 20 09/04/2024    AST 27 10/05/2021    ALKPHOS 87 05/14/2021    BILITOTAL 0.9 05/14/2021     OTHER:   Lab Results   Component Value Date    " "A1C 6.1 (A) 08/23/2024    AFSHIN 9.7 09/04/2024    TSH 2.75 08/19/2024       Anesthesia Plan    ASA Status:  3    NPO Status:  NPO Appropriate    Anesthesia Type: MAC.   Induction: Intravenous, Propofol.   Maintenance: TIVA.        Consents    Anesthesia Plan(s) and associated risks, benefits, and realistic alternatives discussed. Questions answered and patient/representative(s) expressed understanding.     - Discussed: Risks, Benefits and Alternatives for BOTH SEDATION and the PROCEDURE were discussed     - Discussed with:  Patient       - Patient is DNR/DNI Status: No          Postoperative Care    Pain management: IV analgesics, Oral pain medications, Multi-modal analgesia.   PONV prophylaxis: Ondansetron (or other 5HT-3), Dexamethasone or Solumedrol     Comments:    Other Comments: TIVA with Propofol  Zofran for PONV           Bakari Owens MD    I have reviewed the pertinent notes and labs in the chart from the past 30 days and (re)examined the patient.  Any updates or changes from those notes are reflected in this note.             # Drug Induced Platelet Defect: home medication list includes an antiplatelet medication  # Overweight: Estimated body mass index is 28.93 kg/m  as calculated from the following:    Height as of this encounter: 1.473 m (4' 10\").    Weight as of this encounter: 62.8 kg (138 lb 6.4 oz).      "

## 2024-09-20 NOTE — TELEPHONE ENCOUNTER
Patient was seen 9.6.2024 at 8:30am with      Dr. Cindy Beck   [facility]   [address] [place] [address] [phone] -- phone  [phone] -- fax     Had surgery 9.17.2024  - in Epic     I asked patient to reach out to [facility] to have notes from her 9.6.2024 visit and others sent to [facility]. Patient said she will do this today or 9.23.2024     FYI

## 2024-09-22 LAB
PATH REPORT.COMMENTS IMP SPEC: NORMAL
PATH REPORT.FINAL DX SPEC: NORMAL
PATH REPORT.GROSS SPEC: NORMAL
PATH REPORT.MICROSCOPIC SPEC OTHER STN: NORMAL
PATH REPORT.RELEVANT HX SPEC: NORMAL
PHOTO IMAGE: NORMAL

## 2024-09-22 PROCEDURE — 88305 TISSUE EXAM BY PATHOLOGIST: CPT | Mod: 26 | Performed by: PATHOLOGY

## 2024-10-31 ENCOUNTER — TRANSFERRED RECORDS (OUTPATIENT)
Dept: FAMILY MEDICINE | Facility: CLINIC | Age: 69
End: 2024-10-31

## 2024-12-16 ENCOUNTER — OFFICE VISIT (OUTPATIENT)
Dept: FAMILY MEDICINE | Facility: CLINIC | Age: 69
End: 2024-12-16

## 2024-12-16 VITALS
HEART RATE: 73 BPM | SYSTOLIC BLOOD PRESSURE: 126 MMHG | DIASTOLIC BLOOD PRESSURE: 64 MMHG | OXYGEN SATURATION: 96 % | TEMPERATURE: 98.1 F

## 2024-12-16 DIAGNOSIS — R05.8 PRODUCTIVE COUGH: Primary | ICD-10-CM

## 2024-12-16 LAB
ERYTHROCYTE [DISTWIDTH] IN BLOOD BY AUTOMATED COUNT: 12.4 %
HCT VFR BLD AUTO: 40 % (ref 35–47)
HEMOGLOBIN: 13.5 G/DL (ref 11.7–15.7)
MCH RBC QN AUTO: 33.2 PG (ref 26–33)
MCHC RBC AUTO-ENTMCNC: 33.8 G/DL (ref 31–36)
MCV RBC AUTO: 98.4 FL (ref 78–100)
PLATELET COUNT - QUEST: 283 10^9/L (ref 150–375)
RBC # BLD AUTO: 4.07 10*12/L (ref 3.8–5.2)
WBC # BLD AUTO: 9.3 10*9/L (ref 4–11)

## 2024-12-16 PROCEDURE — 85027 COMPLETE CBC AUTOMATED: CPT

## 2024-12-16 PROCEDURE — 99214 OFFICE O/P EST MOD 30 MIN: CPT

## 2024-12-16 PROCEDURE — 36415 COLL VENOUS BLD VENIPUNCTURE: CPT

## 2024-12-16 PROCEDURE — 71046 X-RAY EXAM CHEST 2 VIEWS: CPT

## 2024-12-16 RX ORDER — ALBUTEROL SULFATE 90 UG/1
2 INHALANT RESPIRATORY (INHALATION) EVERY 4 HOURS PRN
Qty: 18 G | Refills: 0 | Status: SHIPPED | OUTPATIENT
Start: 2024-12-16

## 2024-12-16 RX ORDER — AZITHROMYCIN 250 MG/1
TABLET, FILM COATED ORAL
Qty: 6 TABLET | Refills: 0 | Status: SHIPPED | OUTPATIENT
Start: 2024-12-16 | End: 2024-12-21

## 2024-12-16 RX ORDER — BENZONATATE 100 MG/1
100 CAPSULE ORAL 3 TIMES DAILY PRN
Qty: 30 CAPSULE | Refills: 0 | Status: SHIPPED | OUTPATIENT
Start: 2024-12-16

## 2024-12-16 NOTE — PROGRESS NOTES
Assessment & Plan     1. Productive cough (Primary)  - Discussed with Eliza cough is either ongoing viral URI or due to bacterial etiology. CBC is within normal limits and chest x-ray appears negative for pneumonia however will await for final read from radiologist and update patient with any new findings. Discussed still possible for cough to be due to bacterial etiology given productive nature and how long she has had symptoms for. Reviewed risk vs benefits of initiating antibiotics and Eliza would like to proceed. RX for Azithromycin x 5 days sent in, side effects reviewed. Will also RX Tessalon Pearles and Albuterol inhaler for cough, side effects reviewed. Return to clinic and ER precautions discussed.   - XR Chest 2 Views  - VENOUS COLLECTION  - Hemogram Platelet (BFP)  - azithromycin (ZITHROMAX) 250 MG tablet; Take 2 tablets (500 mg) by mouth daily for 1 day, THEN 1 tablet (250 mg) daily for 4 days.  Dispense: 6 tablet; Refill: 0  - benzonatate (TESSALON) 100 MG capsule; Take 1 capsule (100 mg) by mouth 3 times daily as needed for cough.  Dispense: 30 capsule; Refill: 0  - albuterol (PROAIR HFA/PROVENTIL HFA/VENTOLIN HFA) 108 (90 Base) MCG/ACT inhaler; Inhale 2 puffs into the lungs every 4 hours as needed for shortness of breath, wheezing or cough.  Dispense: 18 g; Refill: 0    Follow up as needed. Reasons to follow-up sooner or seek emergent care reviewed.     Lisa Wong PA-C  [facility]       Subjective     Eliza Saenz is a 69 year old female who presents to clinic today for the following health issues:    HPI   Chief Complaint   Patient presents with    Cough     Pt is here for cough 2-3 weeks and is productive and green color  Sob and painful to breathe at times       Eliza presents with her sister with concerns of a cough for the last 3 weeks. Notes her symptoms initially started with a sore throat in the beginning but has almost resolved. She notes her cough is productive with  green and yellow colored phlegm. Notes she feels short of breath with activity, denies any at rest. Is hearing a lot of wheezing, denies any chest pain. States she has some pressure in her sinuses and is congested. She denies any symptoms of fevers/chills, ear pain, sore throat, abdominal pain, nausea/vomiting, or any diarrhea. States she has been taking Tylenol, Flonase, Mucinex, zinc, vitamin C, and plenty of water for her symptoms. She has not been around anyone ill recently.     Daily medications reviewed. She does smoke 8-10 cigarettes per day.       Objective    /64 (BP Location: Left arm, Patient Position: Sitting, Cuff Size: Adult Large)   Pulse 73   Temp 98.1  F (36.7  C) (Oral)   SpO2 96%   There is no height or weight on file to calculate BMI.    Physical Examination:  GENERAL: healthy, alert and no distress  EYES: Eyes grossly normal to inspection, PERRL and conjunctivae and sclerae normal  HENT: ear canals and TM's normal, congestion present, and mouth without ulcers or lesions  NECK: no adenopathy, no asymmetry, masses, or scars and thyroid normal to palpation  RESP: trace exp wheezing in all lung fields, otherwise lungs clear to auscultation  CV: regular rate and rhythm, normal S1 S2, no S3 or S4, no murmur, click or rub, no peripheral edema   ABDOMEN: soft and non-tender   MS: no gross musculoskeletal defects noted, no edema  SKIN: no suspicious lesions or rashes  PSYCH: mentation appears normal, affect normal/bright    Labs reviewed.  CXR - Reviewed and interpreted by me Normal- no infiltrates, effusions, pneumothoraces, cardiomegaly or masses    Results for orders placed or performed in visit on 12/16/24 (from the past 24 hours)   Hemogram Platelet (BFP)   Result Value Ref Range    WBC 9.3 4.0 - 11 10*9/L    RBC Count 4.07 3.8 - 5.2 10*12/L    Hemoglobin 13.5 11.7 - 15.7 g/dL    Hematocrit 40.0 35.0 - 47.0 %    MCV 98.4 78 - 100 fL    MCH 33.2 (A) 26 - 33 pg    MCHC 33.8 31 - 36 g/dL     RDW 12.4 %    Platelet Count 283 150 - 375 10^9/L

## 2024-12-16 NOTE — NURSING NOTE
Chief Complaint   Patient presents with    Cough     Pt is here for cough 2-3 weeks and is productive and green color  Sob and painful to breathe at times      Pre-visit Screening:  Immunizations:  due for td  Colonoscopy:  do not do them anymore  Mammogram: do not do them anymore  Asthma Action Test/Plan:  na  PHQ9:  na  GAD7:  na  Questioned patient about current smoking habits Pt. currently smokes.  Advised about smoking cessation.  Ok to leave detailed message on voice mail for today's visit only yes, phone # [phone] (home)

## 2025-01-03 ENCOUNTER — OFFICE VISIT (OUTPATIENT)
Dept: FAMILY MEDICINE | Facility: CLINIC | Age: 70
End: 2025-01-03

## 2025-01-03 VITALS
TEMPERATURE: 97 F | BODY MASS INDEX: 29.72 KG/M2 | HEART RATE: 68 BPM | WEIGHT: 142.2 LBS | DIASTOLIC BLOOD PRESSURE: 68 MMHG | OXYGEN SATURATION: 94 % | SYSTOLIC BLOOD PRESSURE: 118 MMHG

## 2025-01-03 DIAGNOSIS — R05.8 PRODUCTIVE COUGH: Primary | ICD-10-CM

## 2025-01-03 PROCEDURE — 99214 OFFICE O/P EST MOD 30 MIN: CPT

## 2025-01-03 PROCEDURE — G2211 COMPLEX E/M VISIT ADD ON: HCPCS

## 2025-01-03 RX ORDER — PREDNISONE 20 MG/1
20 TABLET ORAL 2 TIMES DAILY
Qty: 10 TABLET | Refills: 0 | Status: SHIPPED | OUTPATIENT
Start: 2025-01-03 | End: 2025-01-08

## 2025-01-03 RX ORDER — BENZONATATE 100 MG/1
100 CAPSULE ORAL 3 TIMES DAILY PRN
Qty: 30 CAPSULE | Refills: 0 | Status: SHIPPED | OUTPATIENT
Start: 2025-01-03

## 2025-01-03 NOTE — PROGRESS NOTES
Assessment & Plan     1. Productive cough (Primary)  - Overall improving but still having some coughing fits, discussed post-infectious coughs can take up to 4-8 weeks to fully resolve. Will try course of Prednisone x 5 days along with having her continue Tessalon Pearles PRN to help with coughing fits. Can continue with Mucinex, tea with  honey, etc. Return to clinic and ER precautions discussed.    - benzonatate (TESSALON) 100 MG capsule; Take 1 capsule (100 mg) by mouth 3 times daily as needed for cough.  Dispense: 30 capsule; Refill: 0  - predniSONE (DELTASONE) 20 MG tablet; Take 1 tablet (20 mg) by mouth 2 times daily for 5 days.  Dispense: 10 tablet; Refill: 0    Follow up as needed. Reasons to follow-up sooner or seek emergent care reviewed.     Lisa Wong PA-C  [facility]       Subjective     Eliza Saenz is a 69 year old female who presents to clinic today for the following health issues:    HPI   Chief Complaint   Patient presents with    Cough     Was seen 12/16 for cough - still coughing up yellow phlegm, is fatigued. No new sx. Not much improvement since last time.       Eliza presents with her sister for a follow up on her cough. She was last seen on 12/16/24 and prescribed Azithromycin, Tessalon Pearles, and Albuterol inhaler. CBC and chest x-ray were both normal. States her cough is still very productive with yellow phlegm. Also having some shortness of breath with activity, denies any at rest. Feels she is sleeping better and that the Tessalon Pearles are helping, is taking one tablet before bedtime. Also feels wheezing is improving and sinus congestion has resolved. Otherwise denies any other symptoms. Still taking Mucinex, zinc, and vitamin C. Has not been using Albuterol inhaler much.     Daily medications reviewed.      Objective    /68 (BP Location: Right arm, Patient Position: Sitting, Cuff Size: Adult Large)   Pulse 68   Temp 97  F (36.1  C) (Temporal)   Wt  64.5 kg (142 lb 3.2 oz)   SpO2 94%   BMI 29.72 kg/m    Body mass index is 29.72 kg/m .    Physical Examination:  GENERAL: healthy, alert and no distress  EYES: Eyes grossly normal to inspection, PERRL and conjunctivae and sclerae normal  HENT: ear canals and TM's normal, nose and mouth without ulcers or lesions  NECK: no adenopathy, no asymmetry, masses, or scars and thyroid normal to palpation  RESP: lungs clear to auscultation - no rales, rhonchi or wheezes  CV: regular rate and rhythm, normal S1 S2, no S3 or S4, no murmur, click or rub, no peripheral edema   ABDOMEN: soft and non-tender  MS: no gross musculoskeletal defects noted, no edema  SKIN: no suspicious lesions or rashes  PSYCH: mentation appears normal, affect normal/bright

## 2025-01-03 NOTE — NURSING NOTE
Chief Complaint   Patient presents with    Cough     Was seen 12/16 for cough - still coughing up yellow phlegm, is fatigued. No new sx. Not much improvement since last time.      Pre-visit Screening:  Immunizations:  up to date  Colonoscopy:  is up to date  Mammogram: is up to date  Asthma Action Test/Plan:    PHQ9:    GAD7:    Questioned patient about current smoking habits Pt. smokes 1/2 pack cigerettes a day  Ok to leave detailed message on voice mail for today's visit only yes, phone # [phone] (home)

## 2025-02-12 ENCOUNTER — TRANSFERRED RECORDS (OUTPATIENT)
Dept: FAMILY MEDICINE | Facility: CLINIC | Age: 70
End: 2025-02-12

## 2025-03-10 ENCOUNTER — OFFICE VISIT (OUTPATIENT)
Dept: FAMILY MEDICINE | Facility: CLINIC | Age: 70
End: 2025-03-10

## 2025-03-10 VITALS
DIASTOLIC BLOOD PRESSURE: 68 MMHG | WEIGHT: 140 LBS | SYSTOLIC BLOOD PRESSURE: 122 MMHG | BODY MASS INDEX: 29.26 KG/M2 | TEMPERATURE: 98 F | OXYGEN SATURATION: 96 % | HEART RATE: 73 BPM

## 2025-03-10 DIAGNOSIS — R05.8 PRODUCTIVE COUGH: ICD-10-CM

## 2025-03-10 DIAGNOSIS — F41.9 ANXIETY: ICD-10-CM

## 2025-03-10 PROBLEM — N95.0 PMB (POSTMENOPAUSAL BLEEDING): Status: ACTIVE | Noted: 2025-03-10

## 2025-03-10 PROBLEM — N90.4 LICHEN SCLEROSUS OF VULVA: Status: ACTIVE | Noted: 2025-03-10

## 2025-03-10 PROBLEM — R00.2 PALPITATIONS: Status: RESOLVED | Noted: 2021-05-10 | Resolved: 2025-03-10

## 2025-03-10 PROBLEM — Z00.6 RESEARCH STUDY PATIENT: Status: RESOLVED | Noted: 2019-05-21 | Resolved: 2025-03-10

## 2025-03-10 PROBLEM — R06.09 DOE (DYSPNEA ON EXERTION): Status: RESOLVED | Noted: 2021-05-10 | Resolved: 2025-03-10

## 2025-03-10 PROCEDURE — G2211 COMPLEX E/M VISIT ADD ON: HCPCS | Performed by: FAMILY MEDICINE

## 2025-03-10 PROCEDURE — 3074F SYST BP LT 130 MM HG: CPT | Performed by: FAMILY MEDICINE

## 2025-03-10 PROCEDURE — 3078F DIAST BP <80 MM HG: CPT | Performed by: FAMILY MEDICINE

## 2025-03-10 PROCEDURE — 99214 OFFICE O/P EST MOD 30 MIN: CPT | Performed by: FAMILY MEDICINE

## 2025-03-10 RX ORDER — VENLAFAXINE HYDROCHLORIDE 37.5 MG/1
37.5 CAPSULE, EXTENDED RELEASE ORAL DAILY
Qty: 90 CAPSULE | Refills: 1 | Status: SHIPPED | OUTPATIENT
Start: 2025-03-10

## 2025-03-10 RX ORDER — VENLAFAXINE HYDROCHLORIDE 75 MG/1
75 CAPSULE, EXTENDED RELEASE ORAL DAILY
Qty: 90 CAPSULE | Refills: 1 | Status: SHIPPED | OUTPATIENT
Start: 2025-03-10

## 2025-03-10 RX ORDER — ALBUTEROL SULFATE 90 UG/1
2 INHALANT RESPIRATORY (INHALATION) EVERY 4 HOURS PRN
Qty: 18 G | Refills: 0 | Status: SHIPPED | OUTPATIENT
Start: 2025-03-10

## 2025-03-10 ASSESSMENT — ANXIETY QUESTIONNAIRES
3. WORRYING TOO MUCH ABOUT DIFFERENT THINGS: MORE THAN HALF THE DAYS
1. FEELING NERVOUS, ANXIOUS, OR ON EDGE: NEARLY EVERY DAY
GAD7 TOTAL SCORE: 14
IF YOU CHECKED OFF ANY PROBLEMS ON THIS QUESTIONNAIRE, HOW DIFFICULT HAVE THESE PROBLEMS MADE IT FOR YOU TO DO YOUR WORK, TAKE CARE OF THINGS AT HOME, OR GET ALONG WITH OTHER PEOPLE: SOMEWHAT DIFFICULT
6. BECOMING EASILY ANNOYED OR IRRITABLE: MORE THAN HALF THE DAYS
GAD7 TOTAL SCORE: 14
7. FEELING AFRAID AS IF SOMETHING AWFUL MIGHT HAPPEN: MORE THAN HALF THE DAYS
5. BEING SO RESTLESS THAT IT IS HARD TO SIT STILL: SEVERAL DAYS
2. NOT BEING ABLE TO STOP OR CONTROL WORRYING: MORE THAN HALF THE DAYS

## 2025-03-10 ASSESSMENT — PATIENT HEALTH QUESTIONNAIRE - PHQ9
5. POOR APPETITE OR OVEREATING: MORE THAN HALF THE DAYS
SUM OF ALL RESPONSES TO PHQ QUESTIONS 1-9: 11

## 2025-03-10 NOTE — PROGRESS NOTES
"Assessment & Plan   Problem List Items Addressed This Visit       Anxiety    Relevant Medications    venlafaxine (EFFEXOR XR) 75 MG 24 hr capsule    venlafaxine (EFFEXOR XR) 37.5 MG 24 hr capsule     Other Visit Diagnoses       Productive cough        Relevant Medications    albuterol (PROAIR HFA/PROVENTIL HFA/VENTOLIN HFA) 108 (90 Base) MCG/ACT inhaler           1. Productive cough  Viral uri, continue to treat symptoms including otc medications such as afrin, saline nasal spray. Refilled albuterol inhaler. Let me know in a week if no improvement.  - albuterol (PROAIR HFA/PROVENTIL HFA/VENTOLIN HFA) 108 (90 Base) MCG/ACT inhaler; Inhale 2 puffs into the lungs every 4 hours as needed for shortness of breath, wheezing or cough.  Dispense: 18 g; Refill: 0    2. Anxiety  Mostly controlled but she is still having anxiety symptoms and would like to increase her dose of effexor. Fda warning discussed.  - venlafaxine (EFFEXOR XR) 75 MG 24 hr capsule; Take 1 capsule (75 mg) by mouth daily.  Dispense: 90 capsule; Refill: 1  - venlafaxine (EFFEXOR XR) 37.5 MG 24 hr capsule; Take 1 capsule (37.5 mg) by mouth daily.  Dispense: 90 capsule; Refill: 1            BMI  Estimated body mass index is 29.26 kg/m  as calculated from the following:    Height as of 9/17/24: 1.473 m (4' 10\").    Weight as of this encounter: 63.5 kg (140 lb).         FUTURE APPOINTMENTS:       - Follow-up visit in 6 months. We manage her chronic medical care.    No follow-ups on file.    Dora Marie MD  [facility]    Subjective     Nursing Notes:   Jenn Tong CMA  3/10/2025  1:39 PM  Signed  Chief Complaint   Patient presents with    Cough     Cough started 1.5 weeks ago, coughing up thick yellow mucous, heaviness in chest at times, post-nasal drainage     Recheck Medication     Refill venlafaxine     Pre-visit Screening:  Immunizations:  not up to date - tdap at pharmacy  Colonoscopy:  is up to date  Mammogram: pt " declines  Asthma Action Test/Plan:  NA  PHQ9:  Done today  GAD7:  Done today  Questioned patient about current smoking habits Pt. Smokes.  Ok to leave detailed message on voice mail for today's visit only Yes, phone # [phone]       Eliza Saenz is a 69 year old female who presents to clinic today for the following health issues   HPI     Here with her sister for 2 concerns.  Uri sx that are mostly in her sinuses for about 1 week. No fevers and breathing is ok. Occ cough, is using her albuterol inhaler and also wants a refill.  Also due for a refill on her effexor, is currently taking 75 mg/day but would like to increase her dose, is having some anxiety symptoms.        Review of Systems   Constitutional, HEENT, cardiovascular, pulmonary, gi and gu systems are negative, except as otherwise noted.      Objective    /68 (BP Location: Right arm, Patient Position: Sitting, Cuff Size: Adult Large)   Pulse 73   Temp 98  F (36.7  C) (Temporal)   Wt 63.5 kg (140 lb)   SpO2 96%   BMI 29.26 kg/m    Body mass index is 29.26 kg/m .  Physical Exam   GENERAL: alert and no distress  RESP: lungs clear to auscultation - no rales, rhonchi or wheezes  CV: regular rate and rhythm, normal S1 S2, no S3 or S4, no murmur, click or rub, no peripheral edema  MS: no gross musculoskeletal defects noted, no edema  PSYCH: mentation appears normal, affect normal/bright    No results found for any visits on 03/10/25.

## 2025-03-10 NOTE — NURSING NOTE
Chief Complaint   Patient presents with    Cough     Cough started 1.5 weeks ago, coughing up thick yellow mucous, heaviness in chest at times, post-nasal drainage     Recheck Medication     Refill venlafaxine     Pre-visit Screening:  Immunizations:  not up to date - tdap at pharmacy  Colonoscopy:  is up to date  Mammogram: pt declines  Asthma Action Test/Plan:  NA  PHQ9:  Done today  GAD7:  Done today  Questioned patient about current smoking habits Pt. Smokes.  Ok to leave detailed message on voice mail for today's visit only Yes, phone # [phone]

## 2025-03-12 DIAGNOSIS — R05.8 PRODUCTIVE COUGH: Primary | ICD-10-CM

## 2025-03-12 RX ORDER — LEVALBUTEROL TARTRATE 45 UG/1
2 AEROSOL, METERED ORAL EVERY 4 HOURS PRN
Qty: 15 G | Refills: 1 | Status: SHIPPED | OUTPATIENT
Start: 2025-03-12

## 2025-03-12 NOTE — TELEPHONE ENCOUNTER
Insurance sent fax that albuterol inhaler is not covered, we can try to send alternative xopenex.    Eliza Saenz is requesting a refill of:    Pending Prescriptions:                       Disp   Refills    levalbuterol (XOPENEX HFA) 45 MCG/ACT inh*15 g   1            Sig: Inhale 2 puffs into the lungs every 4 hours as           needed for shortness of breath or wheezing.

## 2025-04-28 ENCOUNTER — OFFICE VISIT (OUTPATIENT)
Dept: FAMILY MEDICINE | Facility: CLINIC | Age: 70
End: 2025-04-28

## 2025-04-28 VITALS
TEMPERATURE: 98.1 F | OXYGEN SATURATION: 98 % | HEART RATE: 78 BPM | DIASTOLIC BLOOD PRESSURE: 70 MMHG | SYSTOLIC BLOOD PRESSURE: 116 MMHG

## 2025-04-28 DIAGNOSIS — N95.0 PMB (POSTMENOPAUSAL BLEEDING): ICD-10-CM

## 2025-04-28 DIAGNOSIS — R39.9 URINARY SYMPTOM OR SIGN: Primary | ICD-10-CM

## 2025-04-28 DIAGNOSIS — R35.0 URINARY FREQUENCY: ICD-10-CM

## 2025-04-28 LAB
APPEARANCE UR: ABNORMAL
BACTERIA, UR: ABNORMAL
BILIRUB UR QL: ABNORMAL
CASTS/LPF: ABNORMAL
COLOR UR: YELLOW
EP/HPF: ABNORMAL
GLUCOSE URINE: ABNORMAL MG/DL
HGB UR QL: ABNORMAL
KETONES UR QL: ABNORMAL MG/DL
MISC.: ABNORMAL
NITRITE UR QL STRIP: ABNORMAL
PH UR STRIP: 7 PH (ref 5–7)
PROT UR QL: 30 MG/DL
RBC, UR MICRO: ABNORMAL (ref ?–2)
SP GR UR STRIP: 1.01
UROBILINOGEN UR QL STRIP: 0.2 EU/DL (ref 0.2–1)
WBC #/AREA URNS HPF: ABNORMAL /[HPF]
WBC, UR MICRO: ABNORMAL (ref ?–2)

## 2025-04-28 PROCEDURE — G2211 COMPLEX E/M VISIT ADD ON: HCPCS | Performed by: FAMILY MEDICINE

## 2025-04-28 PROCEDURE — 99214 OFFICE O/P EST MOD 30 MIN: CPT | Performed by: FAMILY MEDICINE

## 2025-04-28 PROCEDURE — 81001 URINALYSIS AUTO W/SCOPE: CPT | Performed by: FAMILY MEDICINE

## 2025-04-28 RX ORDER — MIRABEGRON 25 MG/1
25 TABLET, FILM COATED, EXTENDED RELEASE ORAL DAILY
Qty: 30 TABLET | Refills: 0 | Status: SHIPPED | OUTPATIENT
Start: 2025-04-28 | End: 2025-04-30

## 2025-04-28 NOTE — LETTER
April 30, 2025      Eliza Saenz  [address] [facility] [address]        Dear ,    We are writing to inform you of your test results.    Negative culture. There are red blood cells in the urine. Please be sure to see the urologist.  [facility]   [address] [place] [id number]  [phone] -- appt line  [phone] -- fax     Resulted Orders   URINALYSIS, ROUTINE ([facility])   Result Value Ref Range    Color Urine Yellow     Appearance Urine Cloudy (A)     Glucose Urine Neg mg/dL    Bilirubin Urine Neg     Ketones Urine Neg mg/dL    Specific Gravity Urine 1.010     Blood Urine Large (A)     pH Urine 7.0 pH    Protein Urine 30 (A) neg - neg mg/dL    Urobilinogen Urine 0.2 EU/dL    Nitrite Urine Neg     Leukocytes small (A)     Wbc, Urine Micro 40-50 (A) neg - 2    RBC Micro Urine 20-25 (A) neg - 2    EP/HPF neg     Bacteria Urine moderate (A) neg - neg    Casts/LPF neg     Miscellaneous neg    URINE CULTURE AEROBIC BACTERIAL ([facility])   Result Value Ref Range    Urine Voided Culture SEE NOTE       Comment:          CULTURE, URINE, ROUTINE         Micro Number:      [id number]    Test Status:       Final    Specimen Source:   Urine    Specimen Quality:  Adequate    Result:            Non-uropathogenic Gram positive organism                       May represent colonizers from external and                       internal genitalia. No further testing (including                       susceptibility) will be performed.         If you have any questions or concerns, please call the clinic at the number listed above.       Sincerely,      Dora Marie MD    Electronically signed

## 2025-04-28 NOTE — PROGRESS NOTES
Assessment & Plan   Problem List Items Addressed This Visit       PMB (postmenopausal bleeding)    Relevant Orders    Adult Urology  Referral - To a [facility] Location (Use POS/Location)     Other Visit Diagnoses       Urinary symptom or sign    -  Primary    Relevant Orders    URINALYSIS, ROUTINE ([facility]) (Completed)    URINE CULTURE AEROBIC BACTERIAL ([facility])    Adult Urology  Referral - To a [facility] Location (Use POS/Location)    Urinary frequency        Relevant Medications    mirabegron (MYRBETRIQ) 25 MG 24 hr tablet    Other Relevant Orders    URINE CULTURE AEROBIC BACTERIAL ([facility])    Adult Urology  Referral - To a [facility] Location (Use POS/Location)             1. Urinary symptom or sign (Primary)    - URINALYSIS, ROUTINE ([facility])  - URINE CULTURE AEROBIC BACTERIAL ([facility])  - Adult Urology  Referral - To a [facility] Location (Use POS/Location)    2. Urinary frequency  She has ongoing urinary symptoms, didn't want to see urology because she doesn't want to have cystoscopy. I explained that this is important, given 30 days of myrbetriq at her request as a trial for her symptoms but further evaluation and medication discussion through urology. Referral done, abnormal ua today, culture pending.  - mirabegron (MYRBETRIQ) 25 MG 24 hr tablet; Take 1 tablet (25 mg) by mouth daily.  Dispense: 30 tablet; Refill: 0  - URINE CULTURE AEROBIC BACTERIAL ([facility])  - Adult Urology  Referral - To a [facility] Location (Use POS/Location)    3. PMB (postmenopausal bleeding)  Ongoing, I reviewed the gynecology notes, they wanted to recheck with her again in 3 months, advised that she schedule this appointment.  - Adult Urology  Referral - To a [facility] Location (Use POS/Location)          Nicotine/Tobacco Cessation  She reports that she has been smoking cigarettes. She started smoking about 51 years ago. She  "has a 51.3 pack-year smoking history. She has been exposed to tobacco smoke. She has never used smokeless tobacco.  Nicotine/Tobacco Cessation Plan        BMI  Estimated body mass index is 29.26 kg/m  as calculated from the following:    Height as of 9/17/24: 1.473 m (4' 10\").    Weight as of 3/10/25: 63.5 kg (140 lb).         FUTURE APPOINTMENTS:       - Follow-up visit as needed. We manage her chronic medical care.    No follow-ups on file.    Dora Marie MD  [facility]    Subjective     Nursing Notes:   Jenn Tong, [facility]  4/28/2025 10:42 AM  Signed  Chief Complaint   Patient presents with    Urinary Problem     Increased urinary and frequency, up 3-4 times a night, she would like to try Myrbetriq     Pre-visit Screening:  Immunizations:  up to date  Colonoscopy:  is up to date  Mammogram: is up to date  Asthma Action Test/Plan:  NA  PHQ9:  NA  GAD7:  NA  Questioned patient about current smoking habits Pt. smokes  Ok to leave detailed message on voice mail for today's visit only yes, phone # [phone]       Eliza Saenz is a 69 year old female who presents to clinic today for the following health issues   HPI     Here with sister, is still having urinary symptoms. Urinary frequency. Isn't getting sleep at night, is up at night 2-3 times at night. Wants to try myrbetric to take the urge away.   She has read up on it and wants to try it. Is still having vaginal bleeding every so often. Has [facility]. Last saw them in February, [facility] [place]. Has started estradiol, but this caused breast pain and swelling. So she stopped this. This was a vaginal cream.  She was previously referred to see urology but she didn't go in, because she read up on the procedure and doesn't want to do it.         Review of Systems   Constitutional, HEENT, cardiovascular, pulmonary, gi and gu systems are negative, except as otherwise noted.      Objective    /70 " (BP Location: Right arm, Patient Position: Sitting, Cuff Size: Adult Large)   Pulse 78   Temp 98.1  F (36.7  C) (Temporal)   SpO2 98%   There is no height or weight on file to calculate BMI.  Physical Exam   GENERAL: alert and no distress  MS: no gross musculoskeletal defects noted, no edema  PSYCH: mentation appears normal, affect normal/bright    Results for orders placed or performed in visit on 04/28/25   URINALYSIS, ROUTINE ([facility])     Status: Abnormal   Result Value Ref Range    Color Urine Yellow     Appearance Urine Cloudy (A)     Glucose Urine Neg mg/dL    Bilirubin Urine Neg     Ketones Urine Neg mg/dL    Specific Gravity Urine 1.010     Blood Urine Large (A)     pH Urine 7.0 pH    Protein Urine 30 (A) neg - neg mg/dL    Urobilinogen Urine 0.2 EU/dL    Nitrite Urine Neg     Leukocytes small (A)     Wbc, Urine Micro 40-50 (A) neg - 2    RBC Micro Urine 20-25 (A) neg - 2    EP/HPF neg     Bacteria Urine moderate (A) neg - neg    Casts/LPF neg     Miscellaneous neg

## 2025-04-28 NOTE — NURSING NOTE
Chief Complaint   Patient presents with    Urinary Problem     Increased urinary and frequency, up 3-4 times a night, she would like to try Myrbetriq     Pre-visit Screening:  Immunizations:  up to date  Colonoscopy:  is up to date  Mammogram: is up to date  Asthma Action Test/Plan:  NA  PHQ9:  NA  GAD7:  NA  Questioned patient about current smoking habits Pt. smokes  Ok to leave detailed message on voice mail for today's visit only yes, phone # [phone]

## 2025-04-30 ENCOUNTER — MYC MEDICAL ADVICE (OUTPATIENT)
Dept: FAMILY MEDICINE | Facility: CLINIC | Age: 70
End: 2025-04-30

## 2025-04-30 DIAGNOSIS — N28.9 RENAL INSUFFICIENCY: ICD-10-CM

## 2025-04-30 DIAGNOSIS — R39.9 URINARY SYMPTOM OR SIGN: ICD-10-CM

## 2025-04-30 DIAGNOSIS — R31.29 MICROSCOPIC HEMATURIA: Primary | ICD-10-CM

## 2025-04-30 LAB — URINE VOIDED CULTURE: NORMAL

## 2025-05-05 ENCOUNTER — PATIENT OUTREACH (OUTPATIENT)
Dept: CARE COORDINATION | Facility: CLINIC | Age: 70
End: 2025-05-05
Payer: COMMERCIAL

## 2025-06-14 ENCOUNTER — HEALTH MAINTENANCE LETTER (OUTPATIENT)
Age: 70
End: 2025-06-14

## 2025-06-16 ENCOUNTER — HOSPITAL ENCOUNTER (OUTPATIENT)
Facility: HOSPITAL | Age: 70
End: 2025-06-16
Attending: OBSTETRICS & GYNECOLOGY | Admitting: OBSTETRICS & GYNECOLOGY
Payer: COMMERCIAL

## 2025-07-09 ENCOUNTER — TRANSFERRED RECORDS (OUTPATIENT)
Dept: FAMILY MEDICINE | Facility: CLINIC | Age: 70
End: 2025-07-09

## 2025-07-24 NOTE — PROGRESS NOTES
"Subjective     REQUESTING PROVIDER  Dora Marie    REASON FOR CONSULT  Microscopic hematuria    HISTORY OF PRESENT ILLNESS  Ms. Saenz is a pleasant 70 year old female with a past medical history significant for hyperlipidemia, hypertension, carotid stenosis, borderline diabetes, renal insufficiency, lichen sclerosus, postmenopausal bleeding, and vaginal atrophy.  Who presents today for further evaluation and management of her recently discovered microscopic hematuria.     I reviewed the note from Ms. Marie on 4/28/25 in preparation for today's visit at which time patient endorsed ongoing urinary frequency.  She was prescribed Myrbetriq 25 mg daily.    Hematuria Risk Factors:  Age >40: Yes  History of irritative voiding symptoms: Sometimes frequency of urination   History of urologic disorder or disease (nephrolithiasis, CA of bladder or kidney): No  History of urinary tract infection: No  Smoking history: Yes. 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  History of pelvic irradiation: No    Today:  Endorses some frequency of urination though this comes and goes. Bladder is \"behaving\" today.   Did not try Myrbetriq because it was $190  History of abnormal uterine bleeding and is planning to have hysterectomy done in Sept.  No UTI sx today    SOCIAL HISTORY    Tobacco Use      Smoking status: Every Day        Packs/day: 1.00        Years: 1 pack/day for 51.6 years (51.6 ttl pk-yrs)        Types: Cigarettes        Start date: 1974        Passive exposure: Current      Smokeless tobacco: Never      Tobacco comments: 8-10 cigarettes per day     FAMILY HISTORY  Family History   Problem Relation Age of Onset    Heart Disease Mother     Depression Mother     Hypertension Mother     Alcoholism Father     Heart Disease Father     Skin Cancer Sister     Heart Disease Brother     Coronary Artery Disease Brother     " "Cerebrovascular Disease Brother      No known family history of nephrolithiasis, or cancer of the bladder, kidneys, or other pelvic organs    Objective     /72   Pulse 57   Ht 1.473 m (4' 10\")   Wt 61.7 kg (136 lb)   SpO2 99%   BMI 28.42 kg/m      Physical Exam  Constitutional:       General: She is not in acute distress.  HENT:      Head: Normocephalic.   Eyes:      Extraocular Movements: Extraocular movements intact.      Conjunctiva/sclera: Conjunctivae normal.   Pulmonary:      Effort: Pulmonary effort is normal. No respiratory distress.   Abdominal:      General: Abdomen is flat.   Musculoskeletal:         General: Normal range of motion.   Skin:     General: Skin is warm and dry.   Neurological:      General: No focal deficit present.      Mental Status: She is alert.   Psychiatric:         Mood and Affect: Mood normal.     PELVIC: Examined in lithotomy position with speculum. Normal external genitalia and introitus with normal hair distribution. No vulvar lesions or masses. Mild atrophic changes. Periurethral area nontender with small urethral caruncle, otherwise without visible or palpable masses, discharge, or bleeding. Leak was not seen with CST. No appreciable anterior or posterior wall prolapse. No tenderness with palpation of pelvic floor muscles. Rectal exam deferred.    LABORATORY   Latest Reference Range & Units 08/19/24 00:00 04/28/25 00:00 07/28/25 13:00   Color Urine Colorless, Straw, Light Yellow, Yellow  Yellow Yellow Yellow   Appearance Urine Clear  Clear Cloudy ! Clear   Glucose Urine Negative mg/dL Neg Neg Negative   Bilirubin Urine Negative  Neg Neg Negative   Ketones Urine Negative mg/dL Neg Neg Negative   Specific Gravity Urine 1.003 - 1.035  1.015 1.010 1.015   pH Urine 5.0 - 7.0  7.0 7.0 7.0   Protein Albumin Urine Negative mg/dL   100 !   Protein Urine neg - neg mg/dL neg 30 !    Urobilinogen Urine 0.2, 1.0 E.U./dL 0.2 0.2 0.2   Nitrite Urine Negative  Neg Neg Negative "   Blood Urine Negative  Large ! Large ! Large !   Leukocyte Esterase Urine Negative    Trace !   Wbc, Urine Micro neg - 2  2-3 ! 40-50 !    Leukocytes  neg small !    RBC Micro Urine neg - 2  5-10 ! 20-25 !    Bacteria Urine neg - neg  few ! moderate !    EP/HPF  neg neg    Casts/LPF  neg neg    !: Data is abnormal    Urine Cultures:  8/19/24: No growth  4/28/25: Non-uropathogenic Gram positive organisms     TESTING:   PVR by bladder scan: 25 mL    Assessment & Plan   Microscopic hematuria  Query gross hematuria  Urinary frequency    It was my pleasure to meet with Ms. Saenz in the clinic today regarding her history of microscopic hematuria. She has shown increasing load of RBCs on UA since 2023. UA today shows large blood, microscopy is pending. The differential diagnosis at this point includes stone disease, infection, vaginal contaminant, urothelial malignancy, renal disorder versus another yet unknown diagnosis.     We discussed possible etiologies of microscopic hematuria including both benign and malignant causes. We reviewed the AUA risk stratification flowsheet for microhematuria and agreed that the patient likely falls into the high risk category given her age, smoking history, and possible episode of gross hematuria in the past. As such, we discussed the American U[employer] Association's recommended work-up for hematuria which would include a CT urogram, cystoscopy, and urine cystology. I described these 3 tests to Ms. Saenz as well as the relative risks and benefits of each.    After reviewing the above information, Ms. Saenz expressed understanding and agreement with moving forward with the CT urogram and cystoscopy. Given that she is scheduled for hysterectomy on 9/23/25, I will reach out to her surgeon Dr. Beck to determine if cystoscopy can be completed at time of hysterectomy or if it is recommended prior to surgery.     Regarding urinary frequency, patient is managing this OK right now without  Myrbetriq. She feels her symptoms are better since switching from hot coffee to iced coffee. As such, we reviewed common bladder irritants. Additional lifestyle modifications provided in AVS.     Ms. Saenz expressed understanding and agreement to the above discussion and plan and all of her questions were answered to her satisfaction.      PLAN  First available CT urogram   Will call with results once complete.   Will schedule next available cystoscopy but can cancel if this can be done at time of hysterectomy  Manage urinary frequency with lifestyle modifications (reduced intake of bladder irritants).   Recommend updated BMP given last Cr 1.17/GFR 50. Standing order placed for patient to complete at earliest convenience.   Follow-up TBD pending work-up.    Arlin Cash PA-C  [facility]

## 2025-07-28 ENCOUNTER — OFFICE VISIT (OUTPATIENT)
Dept: UROLOGY | Facility: CLINIC | Age: 70
End: 2025-07-28
Payer: COMMERCIAL

## 2025-07-28 VITALS
DIASTOLIC BLOOD PRESSURE: 72 MMHG | OXYGEN SATURATION: 99 % | HEART RATE: 57 BPM | HEIGHT: 58 IN | SYSTOLIC BLOOD PRESSURE: 126 MMHG | BODY MASS INDEX: 28.55 KG/M2 | WEIGHT: 136 LBS

## 2025-07-28 DIAGNOSIS — R35.0 URINARY FREQUENCY: ICD-10-CM

## 2025-07-28 DIAGNOSIS — R31.29 MICROSCOPIC HEMATURIA: Primary | ICD-10-CM

## 2025-07-28 LAB
ALBUMIN UR-MCNC: 100 MG/DL
APPEARANCE UR: CLEAR
BILIRUB UR QL STRIP: NEGATIVE
COLOR UR AUTO: YELLOW
GLUCOSE UR STRIP-MCNC: NEGATIVE MG/DL
HGB UR QL STRIP: ABNORMAL
KETONES UR STRIP-MCNC: NEGATIVE MG/DL
LEUKOCYTE ESTERASE UR QL STRIP: ABNORMAL
MUCOUS THREADS #/AREA URNS LPF: PRESENT /LPF
NITRATE UR QL: NEGATIVE
PH UR STRIP: 7 [PH] (ref 5–7)
RBC URINE: >182 /HPF
SP GR UR STRIP: 1.01 (ref 1–1.03)
TRANSITIONAL EPI: 4 /HPF
UROBILINOGEN UR STRIP-ACNC: 0.2 E.U./DL
WBC CLUMPS #/AREA URNS HPF: PRESENT /HPF
WBC URINE: 129 /HPF

## 2025-07-28 PROCEDURE — 1126F AMNT PAIN NOTED NONE PRSNT: CPT

## 2025-07-28 PROCEDURE — 3078F DIAST BP <80 MM HG: CPT

## 2025-07-28 PROCEDURE — 81001 URINALYSIS AUTO W/SCOPE: CPT

## 2025-07-28 PROCEDURE — 51798 US URINE CAPACITY MEASURE: CPT

## 2025-07-28 PROCEDURE — 99204 OFFICE O/P NEW MOD 45 MIN: CPT | Mod: 25

## 2025-07-28 PROCEDURE — 3074F SYST BP LT 130 MM HG: CPT

## 2025-07-28 ASSESSMENT — PAIN SCALES - GENERAL: PAINLEVEL_OUTOF10: NO PAIN (0)

## 2025-07-28 NOTE — LETTER
"7/28/2025       RE: Eliza Saenz  [id number] [facility] [address]     Dear Colleague,    Thank you for referring your patient, Eliza Saenz, to the [facility] at [facility]. Please see a copy of my visit note below.    Subjective    REQUESTING PROVIDER  Dora Marie    REASON FOR CONSULT  Microscopic hematuria    HISTORY OF PRESENT ILLNESS  Ms. Saenz is a pleasant 70 year old female with a past medical history significant for hyperlipidemia, hypertension, carotid stenosis, borderline diabetes, renal insufficiency, lichen sclerosus, postmenopausal bleeding, and vaginal atrophy.  Who presents today for further evaluation and management of her recently discovered microscopic hematuria.     I reviewed the note from Ms. Marie on 4/28/25 in preparation for today's visit at which time patient endorsed ongoing urinary frequency.  She was prescribed Myrbetriq 25 mg daily.    Hematuria Risk Factors:  Age >40: Yes  History of irritative voiding symptoms: Sometimes frequency of urination   History of urologic disorder or disease (nephrolithiasis, CA of bladder or kidney): No  History of urinary tract infection: No  Smoking history: Yes. 50  Occupational exposure to chemicals or dyes (ie, benzenes, aromatic amines): [place] fuel/fumes.  History of pelvic irradiation: No    Today:  Endorses some frequency of urination though this comes and goes. Bladder is \"behaving\" today.   Did not try Myrbetriq because it was $190  History of abnormal uterine bleeding and is planning to have hysterectomy done in Sept.  No UTI sx today    SOCIAL HISTORY    Tobacco Use      Smoking status: Every Day        Packs/day: 1.00        Years: 1 pack/day for 51.6 years (51.6 ttl pk-yrs)        Types: Cigarettes        Start date: 1974        Passive exposure: Current      Smokeless tobacco: Never      Tobacco comments: " "8-10 cigarettes per day     FAMILY HISTORY  Family History   Problem Relation Age of Onset     Heart Disease Mother      Depression Mother      Hypertension Mother      Alcoholism Father      Heart Disease Father      Skin Cancer Sister      Heart Disease Brother      Coronary Artery Disease Brother      Cerebrovascular Disease Brother      No known family history of nephrolithiasis, or cancer of the bladder, kidneys, or other pelvic organs    Objective    /72   Pulse 57   Ht 1.473 m (4' 10\")   Wt 61.7 kg (136 lb)   SpO2 99%   BMI 28.42 kg/m      Physical Exam  Constitutional:       General: She is not in acute distress.  HENT:      Head: Normocephalic.   Eyes:      Extraocular Movements: Extraocular movements intact.      Conjunctiva/sclera: Conjunctivae normal.   Pulmonary:      Effort: Pulmonary effort is normal. No respiratory distress.   Abdominal:      General: Abdomen is flat.   Musculoskeletal:         General: Normal range of motion.   Skin:     General: Skin is warm and dry.   Neurological:      General: No focal deficit present.      Mental Status: She is alert.   Psychiatric:         Mood and Affect: Mood normal.     PELVIC: Examined in lithotomy position with speculum. Normal external genitalia and introitus with normal hair distribution. No vulvar lesions or masses. Mild atrophic changes. Periurethral area nontender with small urethral caruncle, otherwise without visible or palpable masses, discharge, or bleeding. Leak was not seen with CST. No appreciable anterior or posterior wall prolapse. No tenderness with palpation of pelvic floor muscles. Rectal exam deferred.    LABORATORY   Latest Reference Range & Units 08/19/24 00:00 04/28/25 00:00 07/28/25 13:00   Color Urine Colorless, Straw, Light Yellow, Yellow  Yellow Yellow Yellow   Appearance Urine Clear  Clear Cloudy ! Clear   Glucose Urine Negative mg/dL Neg Neg Negative   Bilirubin Urine Negative  Neg Neg Negative   Ketones Urine " Negative mg/dL Neg Neg Negative   Specific Gravity Urine 1.003 - 1.035  1.015 1.010 1.015   pH Urine 5.0 - 7.0  7.0 7.0 7.0   Protein Albumin Urine Negative mg/dL   100 !   Protein Urine neg - neg mg/dL neg 30 !    Urobilinogen Urine 0.2, 1.0 E.U./dL 0.2 0.2 0.2   Nitrite Urine Negative  Neg Neg Negative   Blood Urine Negative  Large ! Large ! Large !   Leukocyte Esterase Urine Negative    Trace !   Wbc, Urine Micro neg - 2  2-3 ! 40-50 !    Leukocytes  neg small !    RBC Micro Urine neg - 2  5-10 ! 20-25 !    Bacteria Urine neg - neg  few ! moderate !    EP/HPF  neg neg    Casts/LPF  neg neg    !: Data is abnormal    Urine Cultures:  8/19/24: No growth  4/28/25: Non-uropathogenic Gram positive organisms     TESTING:   PVR by bladder scan: 25 mL    Assessment & Plan  Microscopic hematuria  Query gross hematuria  Urinary frequency    It was my pleasure to meet with Ms. Saenz in the clinic today regarding her history of microscopic hematuria. She has shown increasing load of RBCs on UA since 2023. UA today shows large blood, microscopy is pending. The differential diagnosis at this point includes stone disease, infection, vaginal contaminant, urothelial malignancy, renal disorder versus another yet unknown diagnosis.     We discussed possible etiologies of microscopic hematuria including both benign and malignant causes. We reviewed the AUA risk stratification flowsheet for microhematuria and agreed that the patient likely falls into the high risk category given her age, smoking history, and possible episode of gross hematuria in the past. As such, we discussed the American U[employer] Association's recommended work-up for hematuria which would include a CT urogram, cystoscopy, and urine cystology. I described these 3 tests to Ms. Saenz as well as the relative risks and benefits of each.    After reviewing the above information, Ms. Saenz expressed understanding and agreement with moving forward with the CT urogram  and cystoscopy. Given that she is scheduled for hysterectomy on 9/23/25, I will reach out to her surgeon Dr. Beck to determine if cystoscopy can be completed at time of hysterectomy or if it is recommended prior to surgery.     Regarding urinary frequency, patient is managing this OK right now without Myrbetriq. She feels her symptoms are better since switching from hot coffee to iced coffee. As such, we reviewed common bladder irritants. Additional lifestyle modifications provided in AVS.     Ms. Saenz expressed understanding and agreement to the above discussion and plan and all of her questions were answered to her satisfaction.      PLAN  First available CT urogram   Will call with results once complete.   Will schedule next available cystoscopy but can cancel if this can be done at time of hysterectomy  Manage urinary frequency with lifestyle modifications (reduced intake of bladder irritants).   Recommend updated BMP given last Cr 1.17/GFR 50. Standing order placed for patient to complete at earliest convenience.   Follow-up TBD pending work-up.    Arlin Cash PA-C  [facility]    Again, thank you for allowing me to participate in the care of your patient.      Sincerely,    Arlin Cash PA-C

## 2025-07-28 NOTE — NURSING NOTE
Chief Complaint   Patient presents with    microscopic hematuria     Patient states that she got her period on June 11, 2024.  She wants to know where the blood she sees is coming from - it is uterine blood or bladder blood.    July 8, 2025 is when her last period was.  It lasted one day.    Zuri Chaudhari

## 2025-07-28 NOTE — PATIENT INSTRUCTIONS
Plan  - I will send a message to Dr. Beck about cystoscopy during hysterectomy  - We will schedule both CT urogram and cystoscopy (in clinic) before you leave today  - Review bladder recommendations below     Bladder Recommendations    - Drink 60+ oz of water daily (this is about 3 and 1/2 standard Aquafina bottles)  - Try to urinate every 2-3 hours   - Prevent constipation with a daily fiber supplement: Metamucil, Benefiber, or [place].   - Use MiraLax, magnesium citrate, or Senna/docusate as needed for bowel clean out.   - Avoid bladder irritants (see list below)  Caffeinated soft drinks  Coffee.  Tea.  Chocolate.  Acidic juices and fruits including: cranberry, tart apple or cherry, lemon, orange, pineapple and other citrus fruits.  Alcoholic beverages  Carbonated drinks, especially rafita  Aspartame/Nutrasweet  Spicy foods  Tomato products    Perineal Hygiene Recommendations     - Avoid using soap or any product with fragrance in the genital area. Wash only with warm water.   - Wipe from front to back  - Wear breathable cotton underwear    Thank you for meeting with me today! If you have any questions please do not hesitate to reach out to our office [phone] or reach out via [email or web address] with questions or concerns.     Arlin Cash PA-C  Department of Urology

## 2025-08-13 ENCOUNTER — HOSPITAL ENCOUNTER (OUTPATIENT)
Dept: CT IMAGING | Facility: CLINIC | Age: 70
Discharge: HOME | End: 2025-08-13
Payer: COMMERCIAL

## 2025-08-13 LAB
CREAT BLD-MCNC: 1.1 MG/DL (ref 0.5–1)
EGFRCR SERPLBLD CKD-EPI 2021: 53 ML/MIN/1.73M2

## 2025-08-13 PROCEDURE — 82565 ASSAY OF CREATININE: CPT

## 2025-08-13 PROCEDURE — 250N000011 HC RX IP 250 OP 636

## 2025-08-13 PROCEDURE — 74178 CT ABD&PLV WO CNTR FLWD CNTR: CPT

## 2025-08-13 PROCEDURE — 250N000009 HC RX 250

## 2025-08-13 RX ORDER — IOPAMIDOL 755 MG/ML
500 INJECTION, SOLUTION INTRAVASCULAR ONCE
Status: COMPLETED | OUTPATIENT
Start: 2025-08-13 | End: 2025-08-13

## 2025-08-13 RX ADMIN — SODIUM CHLORIDE 100 ML: 9 INJECTION, SOLUTION INTRAVENOUS at 09:23

## 2025-08-13 RX ADMIN — IOPAMIDOL 69 ML: 755 INJECTION, SOLUTION INTRAVENOUS at 09:23

## 2025-08-14 ENCOUNTER — TELEPHONE (OUTPATIENT)
Dept: UROLOGY | Facility: CLINIC | Age: 70
End: 2025-08-14
Payer: COMMERCIAL

## 2025-08-15 ENCOUNTER — TELEPHONE (OUTPATIENT)
Dept: UROLOGY | Facility: CLINIC | Age: 70
End: 2025-08-15

## 2025-08-18 ENCOUNTER — OFFICE VISIT (OUTPATIENT)
Dept: UROLOGY | Facility: CLINIC | Age: 70
End: 2025-08-18
Payer: COMMERCIAL

## 2025-08-18 VITALS
SYSTOLIC BLOOD PRESSURE: 120 MMHG | BODY MASS INDEX: 29.81 KG/M2 | DIASTOLIC BLOOD PRESSURE: 74 MMHG | HEIGHT: 58 IN | WEIGHT: 142 LBS

## 2025-08-18 DIAGNOSIS — C67.8 MALIGNANT NEOPLASM OF OVERLAPPING SITES OF BLADDER (H): ICD-10-CM

## 2025-08-18 DIAGNOSIS — R31.29 MICROSCOPIC HEMATURIA: Primary | ICD-10-CM

## 2025-08-18 PROCEDURE — 3078F DIAST BP <80 MM HG: CPT | Performed by: STUDENT IN AN ORGANIZED HEALTH CARE EDUCATION/TRAINING PROGRAM

## 2025-08-18 PROCEDURE — 1126F AMNT PAIN NOTED NONE PRSNT: CPT | Performed by: STUDENT IN AN ORGANIZED HEALTH CARE EDUCATION/TRAINING PROGRAM

## 2025-08-18 PROCEDURE — 3074F SYST BP LT 130 MM HG: CPT | Performed by: STUDENT IN AN ORGANIZED HEALTH CARE EDUCATION/TRAINING PROGRAM

## 2025-08-18 PROCEDURE — 52000 CYSTOURETHROSCOPY: CPT | Performed by: STUDENT IN AN ORGANIZED HEALTH CARE EDUCATION/TRAINING PROGRAM

## 2025-08-18 PROCEDURE — 99214 OFFICE O/P EST MOD 30 MIN: CPT | Mod: 25 | Performed by: STUDENT IN AN ORGANIZED HEALTH CARE EDUCATION/TRAINING PROGRAM

## 2025-08-18 RX ORDER — LIDOCAINE HYDROCHLORIDE 20 MG/ML
JELLY TOPICAL ONCE
Status: COMPLETED | OUTPATIENT
Start: 2025-08-18 | End: 2025-08-18

## 2025-08-18 RX ORDER — CEFAZOLIN SODIUM 2 G/50ML
2 SOLUTION INTRAVENOUS
OUTPATIENT
Start: 2025-08-18

## 2025-08-18 RX ORDER — CEFAZOLIN SODIUM 2 G/50ML
2 SOLUTION INTRAVENOUS SEE ADMIN INSTRUCTIONS
OUTPATIENT
Start: 2025-08-18

## 2025-08-18 RX ORDER — ACETAMINOPHEN 650 MG/1
650 SUPPOSITORY RECTAL ONCE
OUTPATIENT
Start: 2025-08-18

## 2025-08-18 RX ORDER — ACETAMINOPHEN 325 MG/1
975 TABLET ORAL ONCE
OUTPATIENT
Start: 2025-08-18 | End: 2025-08-18

## 2025-08-18 RX ADMIN — LIDOCAINE HYDROCHLORIDE 5 ML: 20 JELLY TOPICAL at 08:38

## 2025-08-18 ASSESSMENT — PAIN SCALES - GENERAL: PAINLEVEL_OUTOF10: NO PAIN (0)

## 2025-08-28 ENCOUNTER — OFFICE VISIT (OUTPATIENT)
Dept: CARDIOLOGY | Facility: CLINIC | Age: 70
End: 2025-08-28
Attending: NURSE PRACTITIONER
Payer: COMMERCIAL

## 2025-08-28 VITALS
WEIGHT: 134.1 LBS | OXYGEN SATURATION: 96 % | BODY MASS INDEX: 28.15 KG/M2 | DIASTOLIC BLOOD PRESSURE: 70 MMHG | HEART RATE: 58 BPM | HEIGHT: 58 IN | SYSTOLIC BLOOD PRESSURE: 130 MMHG

## 2025-08-28 DIAGNOSIS — I65.23 CAROTID STENOSIS, BILATERAL: ICD-10-CM

## 2025-08-28 DIAGNOSIS — Z72.0 TOBACCO ABUSE: ICD-10-CM

## 2025-08-28 DIAGNOSIS — I10 ESSENTIAL HYPERTENSION: ICD-10-CM

## 2025-08-28 DIAGNOSIS — E78.2 MIXED HYPERLIPIDEMIA: ICD-10-CM

## 2025-08-28 DIAGNOSIS — I25.10 CORONARY ARTERY DISEASE INVOLVING NATIVE CORONARY ARTERY OF NATIVE HEART WITHOUT ANGINA PECTORIS: Primary | ICD-10-CM

## 2025-08-28 DIAGNOSIS — I27.20 PULMONARY HYPERTENSION (H): ICD-10-CM

## 2025-08-28 RX ORDER — METOPROLOL SUCCINATE 25 MG/1
25 TABLET, EXTENDED RELEASE ORAL DAILY
Qty: 90 TABLET | Refills: 4 | Status: SHIPPED | OUTPATIENT
Start: 2025-08-28

## 2025-08-28 RX ORDER — EZETIMIBE 10 MG/1
10 TABLET ORAL DAILY
Qty: 90 TABLET | Refills: 4 | Status: SHIPPED | OUTPATIENT
Start: 2025-08-28

## 2025-08-28 RX ORDER — CLOPIDOGREL BISULFATE 75 MG/1
75 TABLET ORAL DAILY
Qty: 90 TABLET | Refills: 4 | Status: SHIPPED | OUTPATIENT
Start: 2025-08-28

## 2025-08-28 RX ORDER — ATORVASTATIN CALCIUM 80 MG/1
80 TABLET, FILM COATED ORAL DAILY
Qty: 90 TABLET | Refills: 4 | Status: SHIPPED | OUTPATIENT
Start: 2025-08-28

## 2025-09-03 ENCOUNTER — LAB (OUTPATIENT)
Dept: LAB | Facility: CLINIC | Age: 70
End: 2025-09-03
Payer: COMMERCIAL

## 2025-09-03 DIAGNOSIS — R31.29 MICROSCOPIC HEMATURIA: ICD-10-CM

## 2025-09-03 DIAGNOSIS — R35.0 URINARY FREQUENCY: ICD-10-CM

## 2025-09-03 DIAGNOSIS — I25.10 CORONARY ARTERY DISEASE INVOLVING NATIVE CORONARY ARTERY OF NATIVE HEART WITHOUT ANGINA PECTORIS: ICD-10-CM

## 2025-09-03 LAB
ANION GAP SERPL CALCULATED.3IONS-SCNC: 7 MMOL/L (ref 7–15)
BUN SERPL-MCNC: 13 MG/DL (ref 8–23)
CALCIUM SERPL-MCNC: 9.8 MG/DL (ref 8.8–10.4)
CHLORIDE SERPL-SCNC: 104 MMOL/L (ref 98–107)
CHOLEST SERPL-MCNC: 123 MG/DL
CREAT SERPL-MCNC: 1.05 MG/DL (ref 0.51–0.95)
EGFRCR SERPLBLD CKD-EPI 2021: 57 ML/MIN/1.73M2
FASTING STATUS PATIENT QL REPORTED: YES
FASTING STATUS PATIENT QL REPORTED: YES
GLUCOSE SERPL-MCNC: 119 MG/DL (ref 70–99)
HCO3 SERPL-SCNC: 29 MMOL/L (ref 22–29)
HDLC SERPL-MCNC: 42 MG/DL
HGB BLD-MCNC: 13.2 G/DL (ref 11.7–15.7)
LDLC SERPL CALC-MCNC: 50 MG/DL
MCV RBC AUTO: 95.4 FL (ref 78–100)
NONHDLC SERPL-MCNC: 81 MG/DL
POTASSIUM SERPL-SCNC: 5.1 MMOL/L (ref 3.4–5.3)
SODIUM SERPL-SCNC: 140 MMOL/L (ref 135–145)
TRIGL SERPL-MCNC: 156 MG/DL

## 2025-09-03 PROCEDURE — 36415 COLL VENOUS BLD VENIPUNCTURE: CPT

## 2025-09-03 PROCEDURE — 80061 LIPID PANEL: CPT

## 2025-09-03 PROCEDURE — 85018 HEMOGLOBIN: CPT

## 2025-09-03 PROCEDURE — 80048 BASIC METABOLIC PNL TOTAL CA: CPT

## (undated) DEVICE — PACK MINOR SINGLE BASIN SSK3001

## (undated) DEVICE — GLOVE UNDER INDICATOR PI SZ 6 LF 41660

## (undated) DEVICE — PREP DYNA-HEX 4% CHG SCRUB 4OZ BOTTLE MDS098710

## (undated) DEVICE — SEAL SET MYOSURE ROD LENS SCOPE SINGLE USE 40-902

## (undated) DEVICE — DRSG TELFA 3X4" 1050

## (undated) DEVICE — SOLUTION IRRIG 2B7127 .9NS 3000ML BAG

## (undated) DEVICE — MAT FLOOR SURGICAL 40X38 0702140238

## (undated) DEVICE — SOL WATER IRRIG 1000ML BOTTLE 2F7114

## (undated) DEVICE — CUSTOM PACK PERI GYN SMA5BPGHEA

## (undated) DEVICE — KIT PROCEDURE FLUENT IN/OUT FLOWPAK TISS TRAP FLT-112S

## (undated) DEVICE — GLOVE SURG PI ULTRA TOUCH M SZ 8 LF

## (undated) DEVICE — BRIEF STRETCH XL MPS40

## (undated) RX ORDER — FENTANYL CITRATE 50 UG/ML
INJECTION, SOLUTION INTRAMUSCULAR; INTRAVENOUS
Status: DISPENSED
Start: 2024-09-17

## (undated) RX ORDER — LIDOCAINE HYDROCHLORIDE 10 MG/ML
INJECTION, SOLUTION EPIDURAL; INFILTRATION; INTRACAUDAL; PERINEURAL
Status: DISPENSED
Start: 2024-09-17